# Patient Record
Sex: FEMALE | Race: BLACK OR AFRICAN AMERICAN | NOT HISPANIC OR LATINO | Employment: UNEMPLOYED | URBAN - METROPOLITAN AREA
[De-identification: names, ages, dates, MRNs, and addresses within clinical notes are randomized per-mention and may not be internally consistent; named-entity substitution may affect disease eponyms.]

---

## 2023-12-20 ENCOUNTER — OFFICE VISIT (OUTPATIENT)
Dept: ORTHOPEDIC SURGERY | Facility: CLINIC | Age: 53
End: 2023-12-20
Payer: COMMERCIAL

## 2023-12-20 ENCOUNTER — ANCILLARY PROCEDURE (OUTPATIENT)
Dept: RADIOLOGY | Facility: CLINIC | Age: 53
End: 2023-12-20
Payer: COMMERCIAL

## 2023-12-20 DIAGNOSIS — M54.2 NECK PAIN: ICD-10-CM

## 2023-12-20 DIAGNOSIS — M54.50 LOW BACK PAIN, UNSPECIFIED BACK PAIN LATERALITY, UNSPECIFIED CHRONICITY, UNSPECIFIED WHETHER SCIATICA PRESENT: ICD-10-CM

## 2023-12-20 PROCEDURE — 72110 X-RAY EXAM L-2 SPINE 4/>VWS: CPT

## 2023-12-20 PROCEDURE — 72050 X-RAY EXAM NECK SPINE 4/5VWS: CPT

## 2023-12-20 PROCEDURE — 99204 OFFICE O/P NEW MOD 45 MIN: CPT | Performed by: PHYSICIAN ASSISTANT

## 2023-12-20 PROCEDURE — 72110 X-RAY EXAM L-2 SPINE 4/>VWS: CPT | Performed by: RADIOLOGY

## 2023-12-20 PROCEDURE — 72050 X-RAY EXAM NECK SPINE 4/5VWS: CPT | Performed by: RADIOLOGY

## 2023-12-20 RX ORDER — ACETAMINOPHEN 500 MG
1000 TABLET ORAL EVERY 12 HOURS PRN
Qty: 30 TABLET | Refills: 1 | Status: SHIPPED | OUTPATIENT
Start: 2023-12-20 | End: 2024-01-19

## 2023-12-20 NOTE — PROGRESS NOTES
Scar is a 53-year-old female who presents today to be evaluated for chronic cervical and lumbar pain.  The patient was referred from her treatment facility to follow-up with our spine team today.  She is currently in a treatment facility for crack cocaine and marijuana, she has been using since she was 22 years old and she has been sober for 13 days.    She denies any injury or fall, she states that she has been dealing with this for quite some time but over the last 3 months or so it started to progressively get worse.    In regards to her cervical symptoms, she states that she is dealing with chronic neck pain and stiffness predominantly worse on the left than the right with bilateral upper extremity radiculopathy.  She does state that she often finds herself shaking her hands to for some relief.  Currently she does not have any tremors or decrease in  strength but did mention that periodically she will have episodes of that.  She denies any falls or injuries she is not dragging or tripping over her feet but again did state that there are days where she will periodically feel like she is off balance.    As far as her lumbar spine is concerned, patient states that she has low back pain that is near an old scar of hers.  Thankfully no lower extremity radiculopathy or weakness.  Full control of her bowel and bladder no hip or groin pain.    From a treatment standpoint she was taking ibuprofen previously but the provider at the treatment facility recently just started her on meloxicam.  No formal physical therapy or pain management injections have been done.    Family, social, and medical histories are obtained and reviewed.  -She currently smokes cigarettes every day, 4-5 on average.  -She is in a treatment facility for drug use, she has been sober for 13 days.  She is currently unemployed.    I reviewed the complete 30-point review of systems that was documented on the scanned patient intake form.  All other  systems are non-contributory except as defined in history of present illness.    Const: Well-appearing, well-nourished female in no distress.  Eyes: Normal appearing sclera and conjunctiva, no jaundice, pupils normal in appearance  Neck: No masses or lymphadenopathy appreciated  Resp: breathing comfortably, normal respiratory rate  CV: No upper or lower extremity edema.  Musculoskeletal: [Normal gait]. [Able to heel/toe walk without trouble].  [Cervical] ROM [supple].  Strength exam of upper and lower extremities reveals 5/5 strength in all major muscle groups   [No intrinsic wasting.] [Negative] Spurling sign.  [Negative] straight leg raise [bilaterally].  Neuro: Sensation is intact and equal bilaterally. Deep tendon reflexes are [normal and symmetric].  [No clonus], [negative] Brasher sign, [negative] Lhermitte's sign  Skin: Intact without any lesions, normal turgor  Psych: Alert and oriented x3, normal mood and affect    We are going to obtain x-rays on both her cervical and lumbar spine.  If any abnormality or concerning factors are seen I will call the patient.  She will get these after the visit.    Moving forward, we will treat this very conservatively.  She was given a referral for physical therapy to address both her cervical and lumbar spine.  She did ask for a prescription of Tylenol to be sent in so that she can take this at the facility as well.  She will follow-up with me for a clinical recheck in 8 weeks.    This note was dictated using speech recognition software and was not corrected for spelling or grammatical errors

## 2024-02-13 ENCOUNTER — HOSPITAL ENCOUNTER (EMERGENCY)
Facility: HOSPITAL | Age: 54
Discharge: HOME | End: 2024-02-13
Payer: COMMERCIAL

## 2024-02-13 ENCOUNTER — APPOINTMENT (OUTPATIENT)
Dept: RADIOLOGY | Facility: HOSPITAL | Age: 54
End: 2024-02-13
Payer: COMMERCIAL

## 2024-02-13 VITALS
TEMPERATURE: 98.9 F | HEIGHT: 66 IN | BODY MASS INDEX: 34.55 KG/M2 | DIASTOLIC BLOOD PRESSURE: 86 MMHG | HEART RATE: 104 BPM | SYSTOLIC BLOOD PRESSURE: 120 MMHG | WEIGHT: 215 LBS | OXYGEN SATURATION: 99 % | RESPIRATION RATE: 17 BRPM

## 2024-02-13 DIAGNOSIS — M17.10 ARTHRITIS OF KNEE: Primary | ICD-10-CM

## 2024-02-13 PROCEDURE — 73562 X-RAY EXAM OF KNEE 3: CPT | Mod: LEFT SIDE | Performed by: RADIOLOGY

## 2024-02-13 PROCEDURE — 73562 X-RAY EXAM OF KNEE 3: CPT | Mod: LT

## 2024-02-13 PROCEDURE — 99283 EMERGENCY DEPT VISIT LOW MDM: CPT

## 2024-02-13 PROCEDURE — 99284 EMERGENCY DEPT VISIT MOD MDM: CPT | Performed by: PHYSICIAN ASSISTANT

## 2024-02-13 PROCEDURE — 99284 EMERGENCY DEPT VISIT MOD MDM: CPT

## 2024-02-13 PROCEDURE — 2500000004 HC RX 250 GENERAL PHARMACY W/ HCPCS (ALT 636 FOR OP/ED): Mod: SE

## 2024-02-13 PROCEDURE — 96372 THER/PROPH/DIAG INJ SC/IM: CPT

## 2024-02-13 RX ORDER — KETOROLAC TROMETHAMINE 30 MG/ML
30 INJECTION, SOLUTION INTRAMUSCULAR; INTRAVENOUS ONCE
Status: COMPLETED | OUTPATIENT
Start: 2024-02-13 | End: 2024-02-13

## 2024-02-13 RX ORDER — KETOROLAC TROMETHAMINE 30 MG/ML
INJECTION, SOLUTION INTRAMUSCULAR; INTRAVENOUS
Status: COMPLETED
Start: 2024-02-13 | End: 2024-02-13

## 2024-02-13 RX ADMIN — KETOROLAC TROMETHAMINE 30 MG: 30 INJECTION, SOLUTION INTRAMUSCULAR; INTRAVENOUS at 12:24

## 2024-02-13 ASSESSMENT — LIFESTYLE VARIABLES
HAVE PEOPLE ANNOYED YOU BY CRITICIZING YOUR DRINKING: NO
EVER HAD A DRINK FIRST THING IN THE MORNING TO STEADY YOUR NERVES TO GET RID OF A HANGOVER: NO
EVER FELT BAD OR GUILTY ABOUT YOUR DRINKING: NO
HAVE YOU EVER FELT YOU SHOULD CUT DOWN ON YOUR DRINKING: NO

## 2024-02-13 ASSESSMENT — PAIN SCALES - GENERAL
PAINLEVEL_OUTOF10: 9
PAINLEVEL_OUTOF10: 9

## 2024-02-13 ASSESSMENT — COLUMBIA-SUICIDE SEVERITY RATING SCALE - C-SSRS
1. IN THE PAST MONTH, HAVE YOU WISHED YOU WERE DEAD OR WISHED YOU COULD GO TO SLEEP AND NOT WAKE UP?: NO
2. HAVE YOU ACTUALLY HAD ANY THOUGHTS OF KILLING YOURSELF?: NO
6. HAVE YOU EVER DONE ANYTHING, STARTED TO DO ANYTHING, OR PREPARED TO DO ANYTHING TO END YOUR LIFE?: NO

## 2024-02-13 ASSESSMENT — PAIN - FUNCTIONAL ASSESSMENT: PAIN_FUNCTIONAL_ASSESSMENT: 0-10

## 2024-02-13 NOTE — ED PROVIDER NOTES
"HPI   Chief Complaint   Patient presents with    Knee Pain       This is a 53-year-old female with a history of bipolar disorder, asthma, sciatica who presents with 2 weeks of atraumatic left knee pain.  Denies recent injuries, no recent significant physical activity including extended hikes.  States the pain is diffuse in the left knee and feels \"deeper inside.\"  She has no numbness or tingling sensations.  Has no history of DVT or PE, no recent travel, hospitalizations, surgeries.  No associated chest pain or shortness of breath or palpitations.  She is still able to ambulate.  Has been using diclofenac gel, Tylenol, meloxicam and turmeric supplements, last dose was about 6 hours ago.            Christiano Coma Scale Score: 15                     Patient History   No past medical history on file.  No past surgical history on file.  No family history on file.  Social History     Tobacco Use    Smoking status: Every Day     Types: Cigarettes    Smokeless tobacco: Former   Substance Use Topics    Alcohol use: Not on file    Drug use: Yes     Types: \"Crack\" cocaine, Marijuana     Comment: Currently sober for 13 days       Physical Exam   ED Triage Vitals [02/13/24 1044]   Temperature Heart Rate Respirations BP   37.2 °C (98.9 °F) (!) 104 17 120/86      Pulse Ox Temp src Heart Rate Source Patient Position   99 % -- Monitor --      BP Location FiO2 (%)     -- --       Physical Exam  Constitutional:       Appearance: Normal appearance.   HENT:      Head: Normocephalic and atraumatic.   Pulmonary:      Effort: Pulmonary effort is normal.   Musculoskeletal:      Cervical back: Normal range of motion.      Comments: Left knee diffusely tender in the anterior/bilateral/popliteal areas, no significant swelling noted, full range of motion, sensation intact, no overlying erythema, warmth   Skin:     General: Skin is warm and dry.   Neurological:      General: No focal deficit present.      Mental Status: She is alert and oriented " to person, place, and time.   Psychiatric:         Mood and Affect: Mood normal.         Behavior: Behavior normal.         ED Course & MDM   Diagnoses as of 02/13/24 1615   Arthritis of knee       Medical Decision Making  53-year-old female, is alert and oriented x 3, afebrile and hemodynamically stable presenting with 2 weeks of atraumatic diffuse left knee pain.    Examination reveals intact pulse/motor/sensation, no overlying skin abnormalities including redness or warmth that would suggest septic arthritis.    She was given Toradol and an x-ray of the left knee was obtained.  X-ray with mild osteoarthritis.    Ambulating independently.  Pain better post Toradol.  Advised to follow-up with orthopedic surgery as she is already scheduled for later this month.  Given contact information to sports medicine clinic she can call as needed.  Advised to continue doing as she is at home including acetaminophen, meloxicam and diclofenac gel.  Discharged in stable condition.  All questions answered.          Procedure  Procedures     Kam Mcneil PA-C  02/13/24 1610

## 2024-02-21 ENCOUNTER — APPOINTMENT (OUTPATIENT)
Dept: ORTHOPEDIC SURGERY | Facility: CLINIC | Age: 54
End: 2024-02-21
Payer: COMMERCIAL

## 2024-02-29 ENCOUNTER — OFFICE VISIT (OUTPATIENT)
Dept: ORTHOPEDIC SURGERY | Facility: CLINIC | Age: 54
End: 2024-02-29
Payer: COMMERCIAL

## 2024-02-29 VITALS — HEIGHT: 66 IN | WEIGHT: 215 LBS | BODY MASS INDEX: 34.55 KG/M2

## 2024-02-29 DIAGNOSIS — M25.562 ACUTE PAIN OF LEFT KNEE: ICD-10-CM

## 2024-02-29 DIAGNOSIS — M54.16 LUMBAR RADICULITIS: Primary | ICD-10-CM

## 2024-02-29 PROCEDURE — 99213 OFFICE O/P EST LOW 20 MIN: CPT | Mod: ZK | Performed by: PHYSICIAN ASSISTANT

## 2024-02-29 PROCEDURE — 99213 OFFICE O/P EST LOW 20 MIN: CPT | Performed by: PHYSICIAN ASSISTANT

## 2024-02-29 RX ORDER — IBUPROFEN 600 MG/1
600 TABLET ORAL EVERY 8 HOURS PRN
Qty: 180 TABLET | Refills: 0 | Status: SHIPPED | OUTPATIENT
Start: 2024-02-29 | End: 2024-04-22 | Stop reason: HOSPADM

## 2024-02-29 ASSESSMENT — PAIN - FUNCTIONAL ASSESSMENT: PAIN_FUNCTIONAL_ASSESSMENT: NO/DENIES PAIN

## 2024-02-29 NOTE — PROGRESS NOTES
Scar return today for a follow-up appointment.  We have been following her in regards to her ongoing neck and back pain.  At that visit the patient was given a referral for physical therapy which she states has helped her tremendously.  She is also been taking the meloxicam as prescribed and has seen great improvement with this.  She states that her back pain is almost completely gone.    Unfortunately, the patient has since then started to develop some bilateral wrist and hand pain, worse left-sided as well as some new onset left knee pain and she states that she went to the emergency room and she was told that she has bone-on-bone in her left knee.  I reviewed her x-rays today and she has mild arthritis with some ossification of her patella but nothing that is concerning from a surgical standpoint at the moment.    I am going to provide the patient a referral to see both our knee team as well as our hand team for further workup with an those regions.  She is going to continue with the meloxicam as needed and she will continue with the stretching and exercise program for her back.    She will follow-up with our spine team as needed.    I reviewed the complete 30-point review of systems that was documented on the scanned patient intake form.  All other systems are non-contributory except as defined in history of present illness.    This note was dictated using speech recognition software and was not corrected for spelling or grammatical errors

## 2024-03-04 ENCOUNTER — APPOINTMENT (OUTPATIENT)
Dept: ORTHOPEDIC SURGERY | Facility: CLINIC | Age: 54
End: 2024-03-04
Payer: COMMERCIAL

## 2024-03-08 ENCOUNTER — APPOINTMENT (OUTPATIENT)
Dept: ORTHOPEDIC SURGERY | Facility: CLINIC | Age: 54
End: 2024-03-08
Payer: COMMERCIAL

## 2024-03-14 ENCOUNTER — OFFICE VISIT (OUTPATIENT)
Dept: ORTHOPEDIC SURGERY | Facility: CLINIC | Age: 54
End: 2024-03-14
Payer: COMMERCIAL

## 2024-03-14 ENCOUNTER — APPOINTMENT (OUTPATIENT)
Dept: ORTHOPEDIC SURGERY | Facility: CLINIC | Age: 54
End: 2024-03-14
Payer: COMMERCIAL

## 2024-03-14 ENCOUNTER — HOSPITAL ENCOUNTER (OUTPATIENT)
Dept: RADIOLOGY | Facility: CLINIC | Age: 54
Discharge: HOME | End: 2024-03-14
Payer: COMMERCIAL

## 2024-03-14 VITALS — WEIGHT: 249 LBS | HEIGHT: 66 IN | BODY MASS INDEX: 40.02 KG/M2

## 2024-03-14 DIAGNOSIS — M23.92 ACUTE INTERNAL DERANGEMENT OF LEFT KNEE: ICD-10-CM

## 2024-03-14 DIAGNOSIS — M25.562 CHRONIC PAIN OF LEFT KNEE: ICD-10-CM

## 2024-03-14 DIAGNOSIS — G89.29 CHRONIC PAIN OF LEFT KNEE: Primary | ICD-10-CM

## 2024-03-14 DIAGNOSIS — G89.29 CHRONIC PAIN OF LEFT KNEE: ICD-10-CM

## 2024-03-14 DIAGNOSIS — M25.562 CHRONIC PAIN OF LEFT KNEE: Primary | ICD-10-CM

## 2024-03-14 PROCEDURE — 73564 X-RAY EXAM KNEE 4 OR MORE: CPT | Mod: LT

## 2024-03-14 PROCEDURE — 99215 OFFICE O/P EST HI 40 MIN: CPT | Performed by: STUDENT IN AN ORGANIZED HEALTH CARE EDUCATION/TRAINING PROGRAM

## 2024-03-14 PROCEDURE — 20610 DRAIN/INJ JOINT/BURSA W/O US: CPT | Performed by: STUDENT IN AN ORGANIZED HEALTH CARE EDUCATION/TRAINING PROGRAM

## 2024-03-14 PROCEDURE — 73564 X-RAY EXAM KNEE 4 OR MORE: CPT | Mod: LEFT SIDE | Performed by: RADIOLOGY

## 2024-03-14 RX ORDER — TRIAMCINOLONE ACETONIDE 40 MG/ML
40 INJECTION, SUSPENSION INTRA-ARTICULAR; INTRAMUSCULAR
Status: COMPLETED | OUTPATIENT
Start: 2024-03-14 | End: 2024-03-14

## 2024-03-14 RX ORDER — LIDOCAINE HYDROCHLORIDE 10 MG/ML
4 INJECTION INFILTRATION; PERINEURAL
Status: COMPLETED | OUTPATIENT
Start: 2024-03-14 | End: 2024-03-14

## 2024-03-14 RX ADMIN — TRIAMCINOLONE ACETONIDE 40 MG: 40 INJECTION, SUSPENSION INTRA-ARTICULAR; INTRAMUSCULAR at 14:55

## 2024-03-14 RX ADMIN — LIDOCAINE HYDROCHLORIDE 4 ML: 10 INJECTION INFILTRATION; PERINEURAL at 14:55

## 2024-03-14 ASSESSMENT — PAIN SCALES - GENERAL: PAINLEVEL_OUTOF10: 9

## 2024-03-14 ASSESSMENT — PAIN - FUNCTIONAL ASSESSMENT: PAIN_FUNCTIONAL_ASSESSMENT: 0-10

## 2024-03-14 ASSESSMENT — PAIN DESCRIPTION - DESCRIPTORS: DESCRIPTORS: ACHING

## 2024-03-14 NOTE — PROGRESS NOTES
SUSANNA/TKA Related Summary           L hip: N  L knee: N  R hip: N  R knee: N  Lumbar surgery or significant pathology: N            CC/SUBJECTIVE/HPI            PCP: No Assigned PCP Generic Provider, MD  Referring provider: No ref. provider found  Occupation: Currently not employed  Hobbies: Currently living in a rehabilitation center. 3mo sober.  Scar Prince is a very pleasant 53 y.o. female presenting for 2mo of atraumatic onset L knee pain.  She was recently seen for this pain in the ED on 2/13/2024, where she was diagnosed with mild to moderate osteoarthritis and referred to orthopedics for follow-up and management. She recounts no specific inciting event, but the pain has been getting worse over time. No N/V/C/F.    L knee  Symptoms  Pain: 9/10  Onset: atraumatic  Duration: <3mo  Location: front knee, behind knee, and all over  Quality: sharp/stab, catch/lock, and radiate  Limitations: morning stiffness, pain after activity, feeling unstable, night pain, difficulty with stairs, difficulty in/out of chair/car, and difficulty with socks/shoes/clothes  Ambulation limit due to pain: 100-500ft  Other symptoms: none  Treatment  Tried: activity modification, home exercises, ice/heat, OTCs, and voltaren gel  Most recent injection: none  Assistive device: none  Treatment attempted for <3mo and is never effective            HISTORIES (System Generated and Pt Reported on Form Today)       Dental  Pt reported: edentulous     PMH  Pt reported: Denies heart/lung/kidney/liver issues, DM, stroke, seizure, bariatric surgery, anticoag, MRSA, cancer, personal/familial coagulopathies except: none in addition to below  System generated (PMH, problem list both included since EMR change caused discrepancies): No past medical history on file. There is no problem list on file for this patient.  Review of outside records shows:  Irritable bowel syndrome  Bipolar disorder  Asthma    PSH  Pt reported: Per above.   System generated: No  "past surgical history on file.    Family Hx  Pt reported clot/coagulopathies: none  System generated: No family history on file.    Social Hx  Pt reported substance use: cigarettes (0.5PPD)  System generated:   Social History     Tobacco Use    Smoking status: Every Day     Types: Cigarettes    Smokeless tobacco: Former   Substance Use Topics    Drug use: Yes     Types: \"Crack\" cocaine, Marijuana     Comment: Currently sober for 13 days   Review of outside records shows:  0.5 PPD  2 drinks/week  Crack, marijuana, cocaine sober since 12/2023    Allergies  Pt reported (meds, metals): N  System generated: No Known Allergies    Current Meds  System generated:   Current Outpatient Medications:     ibuprofen 600 mg tablet, Take 1 tablet (600 mg) by mouth every 8 hours if needed for mild pain (1 - 3)., Disp: 180 tablet, Rfl: 0    ROS: Neg except HPI            OBJECTIVE            Physical exam  Estimated body mass index is 34.7 kg/m² as calculated from the following:    Height as of 2/29/24: 1.676 m (5' 6\").    Weight as of 2/29/24: 97.5 kg (215 lb).  Gen/psych: NAD, conversational, appropriate    Ambulation  Gait: antalgic  Assistive device: none  Spine  Lumbar tenderness: neg  Limited ROM: neg, with no radiation or pain with flex/ex, lateral bending  SLR test: neg    Focused MSK exam: L  Knee  Thrust: neg  Skin/incision: normal  Effusion: moderate  Alignment: neutral  Alignment correctable: na  Knee tenderness: diffuse  Pes or Gerdy's tenderness: neg  Crepitation: none  Extension: passive flexion contracture 0-5° and active extension lag 0°  Flexion: 100°, limited by pain  Anterior drawer: Unable to tolerate true testing, but grossly stable  Posterior drawer: Unable to tolerate true testing, but grossly stable  Varus/Valgus in extension: Mild laxity laterally. Symmetric exam to RLE  Varus/Valgus in flexion: stable  Referred pain to knee with hip 90° flexion and 45° ER/IR: neg  Neurovascular    Strength: 4+/5 " hip/knee/ankle flexion and extension  Sensory (L2-S1): SILT throughout lower extremity  Edema/stasis: no pitting edema  Pulse: DP 1+, PT 1+     Imaging  3/14/2024 L knee radiographs ([facility], WB AP/lateral, WB AP flexion) on my read: Joint space narrowing (medial tibiofemoral mild, lateral tibiofemoral minimal, patellofemoral minimal) with no significant signs of degeneration. Limb alignment neutral.            ASSESSMENT & PLAN           Patient verbalized understanding of below A&P. All questions answered.  L Meniscal injury vs exacerbation of mild DJD  History, exam, and imaging are consistent with either an exacerbation of underlying OA or a meniscal injury.  We discussed that, after exhausting more conservative measures as the patient has, a corticosteroid injection is a reasonable next step for either of these diagnoses.  Alternatively, some patients prefer to proceed to an MRI to obtain a diagnosis prior to having this injection.  In this case, the patient prefers to move forward with an injection now and see if the symptoms resolve.  This injection was provided today.  If this resolves the symptoms, may follow-up as needed.  If this does not resolve the symptoms, patient will contact the office to have an MRI order placed in about 3wks.  PMH, PSH, other considerations discussed  Requirement to stop nicotine 6wks before elective surgery.  Psychiatric conditions need to be stable for elective surgery (hx of bipolar)  Higher lifetime risk of revision with index surgery at young age.  BMI<40 but on spectrum of increased risk of surgical complications.  Currently a resident of [facility] treatments that are growing.  Polysubstance use (crack, marijuana, cocaine) as recently as 12/2023. Pt is very pleasant, forthcoming, and proud of her 3mo of sobriety.  Follow-up: As needed.    L Inj/Asp: L knee on 3/14/2024 2:55 PM  Indications: pain  Details: 22 G needle, anterolateral approach  Medications: 40 mg triamcinolone  acetonide 40 mg/mL; 4 mL lidocaine 10 mg/mL (1 %)  Outcome: tolerated well, no immediate complications  Procedure, treatment alternatives, risks and benefits explained, specific risks discussed. Consent was given by the patient. Immediately prior to procedure a time out was called to verify the correct patient, procedure, equipment, support staff and site/side marked as required. Patient was prepped and draped in the usual sterile fashion.         4/10/2024 addendum: Scar continues to have left knee pain.  Additionally, she has noticed additional swelling.  I placed a left knee MRI order and provided her with the number to call and schedule it.  She will contact us after it is completed.    6/3/2024 addendum: 6/1/2024 left knee MRI shows radial tear of medial meniscus, moderate cartilage loss in the medial tibiofemoral and patellofemoral compartments, and a large effusion with synovitis.  I spoke with Mrs. Prince on the phone.  Her primary provider had obtained elevated CRP and rheumatoid labs and provided a referral to a rheumatologist.  I discussed with Mrs. Prinec that I agree this is the next best step given her effusion, synovitis, and lack of intra-articular injury significant enough to account for them.  She understood and will keep her appointment scheduled with rheumatology.

## 2024-04-02 ENCOUNTER — OFFICE VISIT (OUTPATIENT)
Dept: ORTHOPEDIC SURGERY | Facility: CLINIC | Age: 54
End: 2024-04-02
Payer: COMMERCIAL

## 2024-04-02 VITALS — WEIGHT: 249 LBS | BODY MASS INDEX: 40.02 KG/M2 | HEIGHT: 66 IN

## 2024-04-02 DIAGNOSIS — G56.03 CARPAL TUNNEL SYNDROME, BILATERAL: Primary | ICD-10-CM

## 2024-04-02 PROCEDURE — 2500000004 HC RX 250 GENERAL PHARMACY W/ HCPCS (ALT 636 FOR OP/ED): Performed by: ORTHOPAEDIC SURGERY

## 2024-04-02 PROCEDURE — 99213 OFFICE O/P EST LOW 20 MIN: CPT | Performed by: ORTHOPAEDIC SURGERY

## 2024-04-02 PROCEDURE — 2500000005 HC RX 250 GENERAL PHARMACY W/O HCPCS: Performed by: ORTHOPAEDIC SURGERY

## 2024-04-02 PROCEDURE — 20526 THER INJECTION CARP TUNNEL: CPT | Performed by: ORTHOPAEDIC SURGERY

## 2024-04-02 RX ORDER — TRIAMCINOLONE ACETONIDE 40 MG/ML
20 INJECTION, SUSPENSION INTRA-ARTICULAR; INTRAMUSCULAR
Status: COMPLETED | OUTPATIENT
Start: 2024-04-02 | End: 2024-04-02

## 2024-04-02 RX ORDER — LIDOCAINE HYDROCHLORIDE 10 MG/ML
0.5 INJECTION INFILTRATION; PERINEURAL
Status: COMPLETED | OUTPATIENT
Start: 2024-04-02 | End: 2024-04-02

## 2024-04-02 RX ADMIN — TRIAMCINOLONE ACETONIDE 20 MG: 40 INJECTION, SUSPENSION INTRA-ARTICULAR; INTRAMUSCULAR at 19:20

## 2024-04-02 RX ADMIN — LIDOCAINE HYDROCHLORIDE 0.5 ML: 10 INJECTION, SOLUTION INFILTRATION; PERINEURAL at 19:20

## 2024-04-02 ASSESSMENT — PAIN SCALES - GENERAL: PAINLEVEL_OUTOF10: 5 - MODERATE PAIN

## 2024-04-02 ASSESSMENT — PAIN DESCRIPTION - DESCRIPTORS: DESCRIPTORS: ACHING

## 2024-04-02 ASSESSMENT — PAIN - FUNCTIONAL ASSESSMENT: PAIN_FUNCTIONAL_ASSESSMENT: 0-10

## 2024-04-02 NOTE — PROGRESS NOTES
[facility]  Hand and Upper Extremity Service  Initial evaluation / Consultation         Consult requested by Referring Physician: Dr. Chappell    Chief Complaint: Carpal tunnel syndrome          55 y/o female, otherwise healthy right hand dominant. Presenting today for bilateral hand pain and stiffness. Experiencing numbness and tingling. Symptoms have been present for several months. No prior workup or treatment. At times the pain radiates up her arms all the way up to her shoulders and she has a hard time elevating her hands over her head.        Please refer to New Patient Intake Form scanned into patient's electronic record for self reported past medical history, past surgical history, medications, allergies, family history, social history and 10 point review of systems    Examination:  Constitutional: Oriented to person, place, and time.  Appears well-developed and well-nourished.  Head: Normocephalic and atraumatic.  Eyes: Pupils are equal, round, and reactive to light.  Cardiovascular: Intact distal pulses.  Pulmonary/Chest/Breast: Effort normal. No respiratory distress.  Neurological: Alert and oriented to person, place, and time.  Skin: Skin is warm and dry.  Psychiatric: normal mood and affect.  Behavior is normal.  Musculoskeletal: Bilateral hands reveals normal findings. No muscular atrophy. No significant swelling. No deformities. Full range of motion without triggering. Slight tingling in the tips of radial digits bilaterally. Positive Pushpa median nerve compression test bilaterally. Positive Tinel's sign on the right.               Impression: Bilateral carpal tunnel syndrome, right worse than left            Plan: We discussed the diagnosis and treatment options. I believe injections will be very helpful. She has agreed to this treatment. Monitor response and symptoms. If her shoulder symptoms persist I have given her contact information to see one of our primary  sports medicine doctors.            In Office Procedures Performed: Bilateral carpal tunnel injections     Hand / UE Inj/Asp: bilateral carpal tunnel for carpal tunnel syndrome on 4/2/2024 7:20 PM  Indications: pain and therapeutic  Details: 25 G needle, volar approach  Medications (Right): 20 mg triamcinolone acetonide 40 mg/mL; 0.5 mL lidocaine 10 mg/mL (1 %)  Medications (Left): 20 mg triamcinolone acetonide 40 mg/mL; 0.5 mL lidocaine 10 mg/mL (1 %)  Outcome: tolerated well, no immediate complications  Procedure, treatment alternatives, risks and benefits explained, specific risks discussed. Consent was given by the patient. Immediately prior to procedure a time out was called to verify the correct patient, procedure, equipment, support staff and site/side marked as required. Patient was prepped and draped in the usual sterile fashion.                  Medications Prescribed: None                    Follow up: As needed              Buster Hoover MD  [facility]  Department of Orthopaedic Surgery  Hand and Upper Extremity Reconstruction      Scribe Attestation  By signing my name below, I, Jordan Barr , Scrkendra   attest that this documentation has been prepared under the direction and in the presence of Dr. Buster Hoover.      Dictation performed with the use of voice recognition software.  Syntax and grammatical errors may exist.

## 2024-04-11 ENCOUNTER — APPOINTMENT (OUTPATIENT)
Dept: ORTHOPEDIC SURGERY | Facility: CLINIC | Age: 54
End: 2024-04-11
Payer: COMMERCIAL

## 2024-04-15 NOTE — PROGRESS NOTES
[facility]  Hand and Upper Extremity Service  Follow up visit         Follow up for: Bilateral carpal tunnel syndrome     Interval History: Given bilateral carpal tunnel injections on last visit on 4/2. She indicates they essentially did nothing to alleviate her pain. She's frustrated and is about to start a new job and hoping there's something that can be done to help address her symptoms and pain.               Past medical history, medications, allergies, surgical history and review of systems are reviewed and otherwise unchanged when compared to last visit on 4/2/24.         Examination:  Constitutional: Oriented to person, place, and time.  Appears well-developed and well-nourished.  Head: Normocephalic and atraumatic.  Eyes: Pupils are equal, round, and reactive to light.  Cardiovascular: Intact distal pulses.  Pulmonary/Chest/Breast: Effort normal. No respiratory distress.  Neurological: Alert and oriented to person, place, and time.  Skin: Skin is warm and dry.  Psychiatric: normal mood and affect.  Behavior is normal.  Musculoskeletal: Bilateral hands reveal normal appearance. Full digital range of motion. Positive Pushpa nerve compression test. Diminished sensation in her fingertips.               Personal Interpretation of Diagnostic studies: No new images obtained                Impression: Bilateral carpal tunnel syndrome, right worse than left          Plan: I would've expected the injections to help but given that they didn't has raised concern that this may be something other than carpal tunnel syndrome. I've recommended a neuromuscular ultrasound for further investigation. In the meantime, she indicates that Toradol has been effective in managing a lot of these symptoms in the past so I've given her a prescription for a 5 day course of Toradol and advised that she not take any ibuprofen or other anti-inflammatory medications while on Toradol. She'll contact my  office after ultrasound has been completed and I will follow up with her for other treatment recommendations.           In Office Procedures Performed: None              Medications Prescribed: Toradol 10 mg for 5 days               Follow up: Pending results              Buster Hoover MD  [facility]  Department of Orthopaedic Surgery  Hand and Upper Extremity Reconstruction    Scribe Attestation  By signing my name below, I, Jordan Momo , Scribe   attest that this documentation has been prepared under the direction and in the presence of Dr. Buster Hoover.    Dictation performed with the use of voice recognition software.  Syntax and grammatical errors may exist.

## 2024-04-16 ENCOUNTER — OFFICE VISIT (OUTPATIENT)
Dept: ORTHOPEDIC SURGERY | Facility: CLINIC | Age: 54
End: 2024-04-16
Payer: COMMERCIAL

## 2024-04-16 VITALS — WEIGHT: 249 LBS | BODY MASS INDEX: 40.02 KG/M2 | HEIGHT: 66 IN

## 2024-04-16 DIAGNOSIS — G56.03 CARPAL TUNNEL SYNDROME, BILATERAL: Primary | ICD-10-CM

## 2024-04-16 PROCEDURE — 99213 OFFICE O/P EST LOW 20 MIN: CPT | Performed by: ORTHOPAEDIC SURGERY

## 2024-04-16 RX ORDER — KETOROLAC TROMETHAMINE 10 MG/1
10 TABLET, FILM COATED ORAL EVERY 6 HOURS PRN
Qty: 20 TABLET | Refills: 0 | Status: SHIPPED | OUTPATIENT
Start: 2024-04-16 | End: 2024-04-22 | Stop reason: HOSPADM

## 2024-04-16 ASSESSMENT — PAIN SCALES - GENERAL: PAINLEVEL_OUTOF10: 5 - MODERATE PAIN

## 2024-04-16 ASSESSMENT — PAIN - FUNCTIONAL ASSESSMENT: PAIN_FUNCTIONAL_ASSESSMENT: 0-10

## 2024-04-16 ASSESSMENT — PAIN DESCRIPTION - DESCRIPTORS: DESCRIPTORS: ACHING

## 2024-04-19 ENCOUNTER — HOSPITAL ENCOUNTER (OUTPATIENT)
Facility: HOSPITAL | Age: 54
Setting detail: OBSERVATION
Discharge: HOME | End: 2024-04-22
Attending: EMERGENCY MEDICINE | Admitting: NURSE PRACTITIONER
Payer: COMMERCIAL

## 2024-04-19 ENCOUNTER — APPOINTMENT (OUTPATIENT)
Dept: RADIOLOGY | Facility: HOSPITAL | Age: 54
End: 2024-04-19
Payer: COMMERCIAL

## 2024-04-19 DIAGNOSIS — S83.207A TEAR OF LEFT MENISCUS AS CURRENT INJURY, INITIAL ENCOUNTER: ICD-10-CM

## 2024-04-19 DIAGNOSIS — M25.462 KNEE EFFUSION, LEFT: ICD-10-CM

## 2024-04-19 DIAGNOSIS — J90 LARGE PLEURAL EFFUSION: ICD-10-CM

## 2024-04-19 DIAGNOSIS — M54.50 ACUTE MIDLINE LOW BACK PAIN WITHOUT SCIATICA: Primary | ICD-10-CM

## 2024-04-19 PROCEDURE — 2500000001 HC RX 250 WO HCPCS SELF ADMINISTERED DRUGS (ALT 637 FOR MEDICARE OP): Mod: SE | Performed by: PHYSICIAN ASSISTANT

## 2024-04-19 PROCEDURE — 99285 EMERGENCY DEPT VISIT HI MDM: CPT | Mod: 25

## 2024-04-19 PROCEDURE — 72131 CT LUMBAR SPINE W/O DYE: CPT

## 2024-04-19 PROCEDURE — 99285 EMERGENCY DEPT VISIT HI MDM: CPT | Performed by: PHYSICIAN ASSISTANT

## 2024-04-19 PROCEDURE — 72131 CT LUMBAR SPINE W/O DYE: CPT | Performed by: RADIOLOGY

## 2024-04-19 RX ORDER — CYCLOBENZAPRINE HCL 10 MG
10 TABLET ORAL ONCE
Status: COMPLETED | OUTPATIENT
Start: 2024-04-19 | End: 2024-04-19

## 2024-04-19 RX ORDER — CYCLOBENZAPRINE HCL 10 MG
10 TABLET ORAL 3 TIMES DAILY PRN
Qty: 21 TABLET | Refills: 0 | Status: SHIPPED | OUTPATIENT
Start: 2024-04-19 | End: 2024-04-26

## 2024-04-19 RX ADMIN — CYCLOBENZAPRINE 10 MG: 10 TABLET, FILM COATED ORAL at 21:12

## 2024-04-19 ASSESSMENT — PAIN - FUNCTIONAL ASSESSMENT: PAIN_FUNCTIONAL_ASSESSMENT: 0-10

## 2024-04-19 ASSESSMENT — COLUMBIA-SUICIDE SEVERITY RATING SCALE - C-SSRS
2. HAVE YOU ACTUALLY HAD ANY THOUGHTS OF KILLING YOURSELF?: NO
1. IN THE PAST MONTH, HAVE YOU WISHED YOU WERE DEAD OR WISHED YOU COULD GO TO SLEEP AND NOT WAKE UP?: NO
6. HAVE YOU EVER DONE ANYTHING, STARTED TO DO ANYTHING, OR PREPARED TO DO ANYTHING TO END YOUR LIFE?: NO

## 2024-04-19 ASSESSMENT — PAIN SCALES - GENERAL: PAINLEVEL_OUTOF10: 10 - WORST POSSIBLE PAIN

## 2024-04-20 PROBLEM — R26.2 AMBULATORY DYSFUNCTION: Status: ACTIVE | Noted: 2024-04-20

## 2024-04-20 LAB
ALBUMIN SERPL BCP-MCNC: 4 G/DL (ref 3.4–5)
ALP SERPL-CCNC: 105 U/L (ref 33–110)
ALT SERPL W P-5'-P-CCNC: 15 U/L (ref 7–45)
ANION GAP SERPL CALC-SCNC: 15 MMOL/L (ref 10–20)
AST SERPL W P-5'-P-CCNC: 11 U/L (ref 9–39)
BASOPHILS # BLD AUTO: 0.03 X10*3/UL (ref 0–0.1)
BASOPHILS NFR BLD AUTO: 0.4 %
BILIRUB SERPL-MCNC: 0.3 MG/DL (ref 0–1.2)
BUN SERPL-MCNC: 18 MG/DL (ref 6–23)
CALCIUM SERPL-MCNC: 9.4 MG/DL (ref 8.6–10.6)
CHLORIDE SERPL-SCNC: 103 MMOL/L (ref 98–107)
CO2 SERPL-SCNC: 24 MMOL/L (ref 21–32)
CREAT SERPL-MCNC: 0.66 MG/DL (ref 0.5–1.05)
EGFRCR SERPLBLD CKD-EPI 2021: >90 ML/MIN/1.73M*2
EOSINOPHIL # BLD AUTO: 0.06 X10*3/UL (ref 0–0.7)
EOSINOPHIL NFR BLD AUTO: 0.7 %
ERYTHROCYTE [DISTWIDTH] IN BLOOD BY AUTOMATED COUNT: 12.8 % (ref 11.5–14.5)
GLUCOSE SERPL-MCNC: 127 MG/DL (ref 74–99)
HCT VFR BLD AUTO: 36.1 % (ref 36–46)
HGB BLD-MCNC: 12.2 G/DL (ref 12–16)
IMM GRANULOCYTES # BLD AUTO: 0.03 X10*3/UL (ref 0–0.7)
IMM GRANULOCYTES NFR BLD AUTO: 0.4 % (ref 0–0.9)
LYMPHOCYTES # BLD AUTO: 1.7 X10*3/UL (ref 1.2–4.8)
LYMPHOCYTES NFR BLD AUTO: 20.1 %
MCH RBC QN AUTO: 30.5 PG (ref 26–34)
MCHC RBC AUTO-ENTMCNC: 33.8 G/DL (ref 32–36)
MCV RBC AUTO: 90 FL (ref 80–100)
MONOCYTES # BLD AUTO: 0.56 X10*3/UL (ref 0.1–1)
MONOCYTES NFR BLD AUTO: 6.6 %
NEUTROPHILS # BLD AUTO: 6.08 X10*3/UL (ref 1.2–7.7)
NEUTROPHILS NFR BLD AUTO: 71.8 %
NRBC BLD-RTO: 0 /100 WBCS (ref 0–0)
PLATELET # BLD AUTO: 365 X10*3/UL (ref 150–450)
POTASSIUM SERPL-SCNC: 3.5 MMOL/L (ref 3.5–5.3)
PROT SERPL-MCNC: 7.2 G/DL (ref 6.4–8.2)
RBC # BLD AUTO: 4 X10*6/UL (ref 4–5.2)
SODIUM SERPL-SCNC: 138 MMOL/L (ref 136–145)
WBC # BLD AUTO: 8.5 X10*3/UL (ref 4.4–11.3)

## 2024-04-20 PROCEDURE — 80053 COMPREHEN METABOLIC PANEL: CPT | Performed by: EMERGENCY MEDICINE

## 2024-04-20 PROCEDURE — G0378 HOSPITAL OBSERVATION PER HR: HCPCS

## 2024-04-20 PROCEDURE — 96374 THER/PROPH/DIAG INJ IV PUSH: CPT

## 2024-04-20 PROCEDURE — 85025 COMPLETE CBC W/AUTO DIFF WBC: CPT | Performed by: EMERGENCY MEDICINE

## 2024-04-20 PROCEDURE — 2500000001 HC RX 250 WO HCPCS SELF ADMINISTERED DRUGS (ALT 637 FOR MEDICARE OP): Mod: SE | Performed by: STUDENT IN AN ORGANIZED HEALTH CARE EDUCATION/TRAINING PROGRAM

## 2024-04-20 PROCEDURE — 36415 COLL VENOUS BLD VENIPUNCTURE: CPT | Performed by: EMERGENCY MEDICINE

## 2024-04-20 PROCEDURE — 97161 PT EVAL LOW COMPLEX 20 MIN: CPT | Mod: GP

## 2024-04-20 PROCEDURE — 97165 OT EVAL LOW COMPLEX 30 MIN: CPT | Mod: GO

## 2024-04-20 PROCEDURE — 99222 1ST HOSP IP/OBS MODERATE 55: CPT | Performed by: NURSE PRACTITIONER

## 2024-04-20 PROCEDURE — 2500000004 HC RX 250 GENERAL PHARMACY W/ HCPCS (ALT 636 FOR OP/ED): Mod: SE | Performed by: EMERGENCY MEDICINE

## 2024-04-20 PROCEDURE — 2500000001 HC RX 250 WO HCPCS SELF ADMINISTERED DRUGS (ALT 637 FOR MEDICARE OP): Mod: SE | Performed by: NURSE PRACTITIONER

## 2024-04-20 RX ORDER — FLUTICASONE PROPIONATE 110 UG/1
2 AEROSOL, METERED RESPIRATORY (INHALATION)
COMMUNITY

## 2024-04-20 RX ORDER — PANTOPRAZOLE SODIUM 40 MG/1
40 TABLET, DELAYED RELEASE ORAL
Status: ON HOLD | COMMUNITY
End: 2024-04-22

## 2024-04-20 RX ORDER — ACETAMINOPHEN 500 MG
10 TABLET ORAL NIGHTLY PRN
Status: DISCONTINUED | OUTPATIENT
Start: 2024-04-20 | End: 2024-04-22 | Stop reason: HOSPADM

## 2024-04-20 RX ORDER — RISPERIDONE 1 MG/1
1 TABLET ORAL NIGHTLY
COMMUNITY

## 2024-04-20 RX ORDER — ALBUTEROL SULFATE 90 UG/1
2 AEROSOL, METERED RESPIRATORY (INHALATION) EVERY 4 HOURS PRN
COMMUNITY

## 2024-04-20 RX ORDER — ACETAMINOPHEN, DIPHENHYDRAMINE HCL, PHENYLEPHRINE HCL 325; 25; 5 MG/1; MG/1; MG/1
1 TABLET ORAL NIGHTLY PRN
COMMUNITY

## 2024-04-20 RX ORDER — PANTOPRAZOLE SODIUM 40 MG/1
40 TABLET, DELAYED RELEASE ORAL
Status: DISCONTINUED | OUTPATIENT
Start: 2024-04-20 | End: 2024-04-22 | Stop reason: HOSPADM

## 2024-04-20 RX ORDER — CYCLOBENZAPRINE HCL 10 MG
5 TABLET ORAL 3 TIMES DAILY PRN
Status: DISCONTINUED | OUTPATIENT
Start: 2024-04-20 | End: 2024-04-22 | Stop reason: HOSPADM

## 2024-04-20 RX ORDER — MELOXICAM 7.5 MG/1
7.5 TABLET ORAL DAILY
Status: DISCONTINUED | OUTPATIENT
Start: 2024-04-20 | End: 2024-04-21

## 2024-04-20 RX ORDER — ACETAMINOPHEN 325 MG/1
975 TABLET ORAL EVERY 8 HOURS
Status: DISCONTINUED | OUTPATIENT
Start: 2024-04-20 | End: 2024-04-22 | Stop reason: HOSPADM

## 2024-04-20 RX ORDER — ACETAMINOPHEN 500 MG
5000 TABLET ORAL DAILY
COMMUNITY

## 2024-04-20 RX ORDER — POLYETHYLENE GLYCOL 3350 17 G/17G
17 POWDER, FOR SOLUTION ORAL DAILY PRN
Status: DISCONTINUED | OUTPATIENT
Start: 2024-04-20 | End: 2024-04-22 | Stop reason: HOSPADM

## 2024-04-20 RX ORDER — KETOROLAC TROMETHAMINE 30 MG/ML
30 INJECTION, SOLUTION INTRAMUSCULAR; INTRAVENOUS ONCE
Status: COMPLETED | OUTPATIENT
Start: 2024-04-20 | End: 2024-04-20

## 2024-04-20 RX ORDER — DICLOFENAC SODIUM 10 MG/G
4 GEL TOPICAL 2 TIMES DAILY
Status: DISCONTINUED | OUTPATIENT
Start: 2024-04-20 | End: 2024-04-22 | Stop reason: HOSPADM

## 2024-04-20 RX ORDER — CYCLOBENZAPRINE HCL 10 MG
10 TABLET ORAL 3 TIMES DAILY PRN
COMMUNITY
End: 2024-04-22 | Stop reason: HOSPADM

## 2024-04-20 RX ADMIN — ACETAMINOPHEN 975 MG: 325 TABLET ORAL at 10:32

## 2024-04-20 RX ADMIN — PANTOPRAZOLE SODIUM 40 MG: 40 TABLET, DELAYED RELEASE ORAL at 06:22

## 2024-04-20 RX ADMIN — CYCLOBENZAPRINE HYDROCHLORIDE 5 MG: 10 TABLET, FILM COATED ORAL at 14:32

## 2024-04-20 RX ADMIN — KETOROLAC TROMETHAMINE 30 MG: 30 INJECTION, SOLUTION INTRAMUSCULAR at 02:04

## 2024-04-20 RX ADMIN — ACETAMINOPHEN 975 MG: 325 TABLET ORAL at 18:12

## 2024-04-20 RX ADMIN — MELOXICAM 7.5 MG: 7.5 TABLET ORAL at 14:22

## 2024-04-20 SDOH — SOCIAL STABILITY: SOCIAL INSECURITY: ARE THERE ANY APPARENT SIGNS OF INJURIES/BEHAVIORS THAT COULD BE RELATED TO ABUSE/NEGLECT?: NO

## 2024-04-20 SDOH — SOCIAL STABILITY: SOCIAL INSECURITY: ABUSE: ADULT

## 2024-04-20 SDOH — SOCIAL STABILITY: SOCIAL INSECURITY: DOES ANYONE TRY TO KEEP YOU FROM HAVING/CONTACTING OTHER FRIENDS OR DOING THINGS OUTSIDE YOUR HOME?: YES

## 2024-04-20 SDOH — SOCIAL STABILITY: SOCIAL INSECURITY: HAVE YOU HAD THOUGHTS OF HARMING ANYONE ELSE?: YES

## 2024-04-20 SDOH — SOCIAL STABILITY: SOCIAL INSECURITY: HAS ANYONE EVER THREATENED TO HURT YOUR FAMILY OR YOUR PETS?: YES

## 2024-04-20 SDOH — SOCIAL STABILITY: SOCIAL INSECURITY: ARE YOU OR HAVE YOU BEEN THREATENED OR ABUSED PHYSICALLY, EMOTIONALLY, OR SEXUALLY BY ANYONE?: YES

## 2024-04-20 SDOH — SOCIAL STABILITY: SOCIAL INSECURITY: DO YOU FEEL ANYONE HAS EXPLOITED OR TAKEN ADVANTAGE OF YOU FINANCIALLY OR OF YOUR PERSONAL PROPERTY?: NO

## 2024-04-20 SDOH — SOCIAL STABILITY: SOCIAL INSECURITY: DO YOU FEEL UNSAFE GOING BACK TO THE PLACE WHERE YOU ARE LIVING?: NO

## 2024-04-20 SDOH — SOCIAL STABILITY: SOCIAL INSECURITY: WERE YOU ABLE TO COMPLETE ALL THE BEHAVIORAL HEALTH SCREENINGS?: NO

## 2024-04-20 SDOH — SOCIAL STABILITY: SOCIAL INSECURITY: HAVE YOU HAD ANY THOUGHTS OF HARMING ANYONE ELSE?: YES

## 2024-04-20 ASSESSMENT — LIFESTYLE VARIABLES
HOW OFTEN DO YOU HAVE 6 OR MORE DRINKS ON ONE OCCASION: NEVER
HOW OFTEN DO YOU HAVE A DRINK CONTAINING ALCOHOL: NEVER
EVER HAD A DRINK FIRST THING IN THE MORNING TO STEADY YOUR NERVES TO GET RID OF A HANGOVER: NO
HAVE PEOPLE ANNOYED YOU BY CRITICIZING YOUR DRINKING: NO
HOW MANY STANDARD DRINKS CONTAINING ALCOHOL DO YOU HAVE ON A TYPICAL DAY: PATIENT DOES NOT DRINK
TOTAL SCORE: 0
AUDIT-C TOTAL SCORE: 0
SKIP TO QUESTIONS 9-10: 1
HAVE YOU EVER FELT YOU SHOULD CUT DOWN ON YOUR DRINKING: NO
AUDIT-C TOTAL SCORE: 0
EVER FELT BAD OR GUILTY ABOUT YOUR DRINKING: NO

## 2024-04-20 ASSESSMENT — PAIN DESCRIPTION - DESCRIPTORS: DESCRIPTORS: ACHING

## 2024-04-20 ASSESSMENT — COGNITIVE AND FUNCTIONAL STATUS - GENERAL
DRESSING REGULAR LOWER BODY CLOTHING: A LOT
PERSONAL GROOMING: A LITTLE
DRESSING REGULAR LOWER BODY CLOTHING: A LOT
WALKING IN HOSPITAL ROOM: A LOT
MOVING FROM LYING ON BACK TO SITTING ON SIDE OF FLAT BED WITH BEDRAILS: A LITTLE
HELP NEEDED FOR BATHING: A LITTLE
MOBILITY SCORE: 18
MOVING TO AND FROM BED TO CHAIR: A LITTLE
DRESSING REGULAR LOWER BODY CLOTHING: A LITTLE
HELP NEEDED FOR BATHING: A LOT
DAILY ACTIVITIY SCORE: 14
CLIMB 3 TO 5 STEPS WITH RAILING: A LITTLE
TURNING FROM BACK TO SIDE WHILE IN FLAT BAD: A LITTLE
PERSONAL GROOMING: A LOT
DRESSING REGULAR UPPER BODY CLOTHING: A LOT
DRESSING REGULAR LOWER BODY CLOTHING: A LOT
CLIMB 3 TO 5 STEPS WITH RAILING: A LOT
MOBILITY SCORE: 20
WALKING IN HOSPITAL ROOM: A LOT
HELP NEEDED FOR BATHING: A LOT
DAILY ACTIVITIY SCORE: 19
HELP NEEDED FOR BATHING: A LOT
DRESSING REGULAR UPPER BODY CLOTHING: A LOT
WALKING IN HOSPITAL ROOM: A LOT
DRESSING REGULAR UPPER BODY CLOTHING: A LITTLE
DRESSING REGULAR UPPER BODY CLOTHING: A LOT
PERSONAL GROOMING: A LOT
MOBILITY SCORE: 20
WALKING IN HOSPITAL ROOM: A LITTLE
TOILETING: A LOT
PERSONAL GROOMING: A LOT
PATIENT BASELINE BEDBOUND: NO
STANDING UP FROM CHAIR USING ARMS: A LITTLE
TOILETING: A LOT
TOILETING: A LITTLE
MOBILITY SCORE: 20
TOILETING: A LOT
DAILY ACTIVITIY SCORE: 14
DAILY ACTIVITIY SCORE: 14

## 2024-04-20 ASSESSMENT — ENCOUNTER SYMPTOMS
FEVER: 0
FLANK PAIN: 0
SHORTNESS OF BREATH: 0
HEMATURIA: 0
BACK PAIN: 1
ABDOMINAL PAIN: 0
FREQUENCY: 0
ARTHRALGIAS: 1
DIARRHEA: 0
PALPITATIONS: 0
CHILLS: 0
DYSURIA: 0
VOMITING: 0
NAUSEA: 0
CONSTIPATION: 0

## 2024-04-20 ASSESSMENT — ACTIVITIES OF DAILY LIVING (ADL)
ADL_ASSISTANCE: INDEPENDENT
JUDGMENT_ADEQUATE_SAFELY_COMPLETE_DAILY_ACTIVITIES: YES
BATHING: NEEDS ASSISTANCE
WALKS IN HOME: NEEDS ASSISTANCE
ADEQUATE_TO_COMPLETE_ADL: YES
HEARING - RIGHT EAR: FUNCTIONAL
TOILETING: NEEDS ASSISTANCE
ADL_ASSISTANCE: INDEPENDENT
PATIENT'S MEMORY ADEQUATE TO SAFELY COMPLETE DAILY ACTIVITIES?: YES
FEEDING YOURSELF: NEEDS ASSISTANCE
BATHING_ASSISTANCE: MINIMAL
DRESSING YOURSELF: NEEDS ASSISTANCE
HEARING - LEFT EAR: FUNCTIONAL
GROOMING: NEEDS ASSISTANCE

## 2024-04-20 ASSESSMENT — PAIN DESCRIPTION - LOCATION: LOCATION: KNEE

## 2024-04-20 ASSESSMENT — PAIN - FUNCTIONAL ASSESSMENT
PAIN_FUNCTIONAL_ASSESSMENT: 0-10

## 2024-04-20 ASSESSMENT — PATIENT HEALTH QUESTIONNAIRE - PHQ9
SUM OF ALL RESPONSES TO PHQ9 QUESTIONS 1 & 2: 0
1. LITTLE INTEREST OR PLEASURE IN DOING THINGS: NOT AT ALL
2. FEELING DOWN, DEPRESSED OR HOPELESS: NOT AT ALL

## 2024-04-20 ASSESSMENT — PAIN SCALES - GENERAL
PAINLEVEL_OUTOF10: 3
PAINLEVEL_OUTOF10: 1
PAINLEVEL_OUTOF10: 8
PAINLEVEL_OUTOF10: 1
PAINLEVEL_OUTOF10: 5 - MODERATE PAIN
PAINLEVEL_OUTOF10: 5 - MODERATE PAIN

## 2024-04-20 ASSESSMENT — PAIN DESCRIPTION - PAIN TYPE: TYPE: CHRONIC PAIN

## 2024-04-20 NOTE — ED TRIAGE NOTES
Left knee pain denies trauma or injury, pt states that she has a MRI scheduled on 4/30, also c/o  lower back pain denies any trauma, injury, urinary symptoms,

## 2024-04-20 NOTE — PROGRESS NOTES
Physical Therapy    Physical Therapy Evaluation    Patient Name: Scar Prince  MRN: [id number]  Today's Date: 4/20/2024   Time Calculation  Start Time: 0842  Stop Time: 0854  Time Calculation (min): 12 min    Assessment/Plan   PT Assessment  PT Assessment Results: Decreased strength, Decreased endurance, Impaired balance, Decreased mobility, Pain  Rehab Prognosis: Good  Evaluation/Treatment Tolerance: Patient limited by pain  Medical Staff Made Aware: Yes  End of Session Communication: Bedside nurse  Assessment Comment: Pt is a 54 year old female who presents for knee pain. Pt deficits for strength, endurance, balance leading to impairment in function mobility.  End of Session Patient Position: Bed, 3 rail up, Alarm off, not on at start of sessionDictation #1  MRN:[id number]  [facility]:[id number]   IP OR SWING BED PT PLAN  Inpatient or Swing Bed: Inpatient  PT Plan  Treatment/Interventions: Bed mobility, Transfer training, Gait training, Stair training, Balance training, Neuromuscular re-education, Endurance training, Strengthening, Range of motion, Therapeutic exercise, Home exercise program, Therapeutic activity, Positioning, Postural re-education  PT Plan: Skilled PT  PT Frequency: 3 times per week  PT Discharge Recommendations: Low intensity level of continued care  Equipment Recommended upon Discharge:  (front wheeled walker)  PT Recommended Transfer Status: Assist x1      Subjective   General Visit Information:  General  Reason for Referral: presented to the ED for left knee pain  Past Medical History Relevant to Rehab: OA of the bilateral knees and carpal tunnel syndrome  Family/Caregiver Present: No  Co-Treatment: OT  Co-Treatment Reason: To maximize pt safety, function, and mobility  Prior to Session Communication: Bedside nurse, Physician (cleared for therapy)  Patient Position Received: Bed, 3 rail up, Alarm off, not on at start of session  General Comment: Pt is pleasant, cooperative, and willing to partivipate  in therapy.  Home Living:  Home Living  Type of Home:  (Treatment center)  Lives With: Alone  Home Layout: Multi-level  Home Access: Stairs to enter with rails  Entrance Stairs-Number of Steps: 4  Bathroom Shower/Tub: Walk-in shower  Bathroom Toilet: Standard  Prior Level of Function:  Prior Function Per Pt/Caregiver Report  Level of [place]: Independent with ADLs and functional transfers, Independent with homemaking with ambulation  ADL Assistance: Independent  Homemaking Assistance: Independent  Ambulatory Assistance: Independent  Leisure: enjoys playing cards  Prior Function Comments: no hx of falls  Precautions:  Precautions  LE Weight Bearing Status:  (WBAT per Claritza Bojorquez, DO per ortho)  Medical Precautions: Fall precautions  Vital Signs:       Objective   Pain:  Pain Assessment  Pain Assessment: 0-10  Pain Score: 5 - Moderate pain  Pain Type: Acute pain  Pain Location:  (L knee)  Cognition:  Cognition  Overall Cognitive Status: Within Functional Limits  Orientation Level: Oriented X4  Impulsive: Mildly    General Assessments:  Activity Tolerance  Endurance: Tolerates 10 - 20 min exercise with multiple rests    Sensation  Sensation Comment: denies numbness and tingling at this time    Functional Assessments:  Bed Mobility  Bed Mobility: Yes  Bed Mobility 1  Bed Mobility 1: Supine to sitting, Sitting to supine  Level of Assistance 1:  (SBA)  Bed Mobility Comments 1: VCs, increased time to perform    Transfers  Transfer: Yes  Transfer 1  Transfer From 1: Sit to, Stand to  Transfer to 1: Stand, Sit  Technique 1: Sit to stand, Stand to sit  Transfer Device 1: Walker  Transfer Level of Assistance 1: Contact guard, Moderate verbal cues  Trials/Comments 1: VCs, x2 trials, increased time to perform, cues for hip and knee extension    Ambulation/Gait Training  Ambulation/Gait Training Performed: Yes  Ambulation/Gait Training 1  Surface 1: Level tile  Device 1: Rolling walker  Assistance 1: Contact guard, Moderate  verbal cues  Quality of Gait 1: Decreased step length, Inconsistent stride length, Soft knee(s) (decreased foot clearance)  Comments/Distance (ft) 1: 25 ft x2    Stairs  Stairs: No  Extremity/Trunk Assessments:  RLE   RLE :  (>/= 3+/5 observed via function)  LLE   LLE :  (>/= 3+/5 observed via function)  Outcome Measures:  [facility] Basic Mobility  Turning from your back to your side while in a flat bed without using bedrails: A little  Moving from lying on your back to sitting on the side of a flat bed without using bedrails: A little  Moving to and from bed to chair (including a wheelchair): A little  Standing up from a chair using your arms (e.g. wheelchair or bedside chair): A little  To walk in hospital room: A little  Climbing 3-5 steps with railing: A little  Basic Mobility - Total Score: 18    Encounter Problems       Encounter Problems (Active)       Mobility       STG - Patient will ambulate >150 ft with mod I and LRD with no LOB, progress as able and appropriate        Start:  04/20/24    Expected End:  05/04/24            STG - Patient will ascend and descend a flight of stairs independently        Start:  04/20/24    Expected End:  05/04/24            Pt will ambulate 150 ft with mod I and LRD and no signs of fatigue or SOB       Start:  04/20/24    Expected End:  05/04/24               PT Transfers       STG - Patient will perform bed mobility independently        Start:  04/20/24    Expected End:  05/04/24            STG - Patient will transfer sit to and from stand with mod I and LRD       Start:  04/20/24    Expected End:  05/04/24                   Education Documentation  Precautions, taught by Maribell Ni, PT at 4/20/2024  1:32 PM.  Learner: Patient  Readiness: Acceptance  Method: Explanation  Response: Verbalizes Understanding  Comment: bed mobility, transfers, gait, activity and symptom monitoring    Mobility Training, taught by Maribell Ni PT at 4/20/2024  1:32 PM.  Learner:  Patient  Readiness: Acceptance  Method: Explanation  Response: Verbalizes Understanding  Comment: bed mobility, transfers, gait, activity and symptom monitoring    Education Comments  No comments found.        04/20/24 at 1:34 PM - Maribell Ni, PT

## 2024-04-20 NOTE — PROGRESS NOTES
Pharmacy Medication History Review    Scar Prince is a 54 y.o. female admitted for Ambulatory dysfunction. Pharmacy reviewed the patient's [id number] medications and allergies for accuracy.    The list below reflects the updated PTA list. Comments regarding how patient may be taking medications differently can be found in the Admit Orders Activity.  Prior to Admission Medications   Prescriptions Last Dose Informant   albuterol (Ventolin HFA) 90 mcg/actuation inhaler  Self, Other   Sig: Inhale 2 puffs every 4 hours if needed for wheezing.   cholecalciferol (Vitamin D3) 5,000 Units tablet Unknown Self, Other   Sig: Take 1 tablet (5,000 Units) by mouth once daily.   cyclobenzaprine (Flexeril) 10 mg tablet Haven't started taking Self, Other   Sig: Take 1 tablet (10 mg) by mouth 3 times a day as needed for muscle spasms.   fluticasone (Flovent HFA) 110 mcg/actuation inhaler Unknown Self, Other   Sig: Inhale 2 puffs 2 times a day. Rinse mouth with water after use to reduce aftertaste and incidence of candidiasis. Do not swallow.   ketorolac (Toradol) 10 mg tablet Unknown Self, Other   Sig: Take 1 tablet (10 mg) by mouth every 6 hours if needed for moderate pain (4 - 6) for up to 5 days.   melatonin 10 mg tablet Unknown Self, Other   Sig: Take by mouth as needed at bedtime.   pantoprazole (ProtoNix) 40 mg EC tablet Unknown Self, Other   Sig: Take 1 tablet (40 mg) by mouth once daily in the morning. Take before meals. Do not crush, chew, or split.   risperiDONE (RisperDAL) 1 mg tablet 4/19/2024 Self, Other   Sig: Take 1 tablet (1 mg) by mouth once daily at bedtime.      Facility-Administered Medications: None        The list below reflects the updated allergy list. Please review each documented allergy for additional clarification and justification.  Allergies  Reviewed by Adilene Reeves, PharmD on 4/20/2024   No Known Allergies         Patient accepts M2B at discharge. Pharmacy has been updated to [facility]  [facility].    Sources used to complete the med history include   - out patient fill history, OARRS, and patient interview  - Patient (good historian)      Below are additional concerns with the patient's PTA list.  - Patient reported she haven't started taking her cyclobenzaprine yet (Haven't picked it up from pharmacy)    Adilene Reeves, PharmD  PGY-1 Pharmacy Resident  [facility]  Please reach out via [employer] Secure Chat for questions, or if no response call [employer] or [place]”

## 2024-04-20 NOTE — H&P
"History Of Present Illness  Scar Prince is a 54 y.o. female with past medical history of OA of the bilateral knees and carpal tunnel syndrome who presented to the ED for left knee pain. Notably, she is followed by ortho and recently received cortisone injections for her knees and was scheduled for an MRI on 4/30. She denies any recent injury or trauma. She also endorses lower back pain that is chronic in nature (per EMR). She denies any fever, chills, chest pain, numbness/tingling, or cauda equina symptoms. She reports that 5 months ago she was started on meloxicam which improved her back pain until 3 days ago, and notes she has not used the meloxicam recently. On exam in ED[facility], she reports no change since arrival. She reports she stop taking the meloxicam as she was told she no longer needed it. She last received cortisone injections in her knees around 6 weeks ago which she reports being totally ineffective, however typically they were effective. She is a current smoker, former EtOH dependent (5 months sober), denies illicit substance use.      Past Medical History  No past medical history on file.    Surgical History  No past surgical history on file.     Social History  She reports that she has been smoking cigarettes. She has quit using smokeless tobacco. She reports current drug use. Drugs: \"Crack\" cocaine and Marijuana. No history on file for alcohol use.    Family History  No family history on file.     Allergies  Patient has no known allergies.    Review of Systems   Constitutional:  Negative for chills and fever.   Respiratory:  Negative for shortness of breath.    Cardiovascular:  Negative for chest pain and palpitations.   Gastrointestinal:  Negative for abdominal pain, constipation, diarrhea, nausea and vomiting.   Genitourinary:  Negative for dysuria, flank pain, frequency, hematuria and urgency.   Musculoskeletal:  Positive for arthralgias, back pain and gait problem.   All other systems reviewed " "and are negative.       Physical Exam  Vitals reviewed.   Constitutional:       Appearance: She is obese.   HENT:      Head: Normocephalic and atraumatic.   Cardiovascular:      Rate and Rhythm: Normal rate and regular rhythm.      Heart sounds: Normal heart sounds.   Pulmonary:      Effort: Pulmonary effort is normal.      Breath sounds: Normal air entry.   Abdominal:      General: Bowel sounds are normal.      Palpations: Abdomen is soft.      Tenderness: There is no abdominal tenderness.   Musculoskeletal:         General: No deformity.   Skin:     General: Skin is warm and dry.   Neurological:      General: No focal deficit present.      Mental Status: She is alert and oriented to person, place, and time.   Psychiatric:         Mood and Affect: Mood normal.         Behavior: Behavior normal.          Last Recorded Vitals  Blood pressure 111/82, pulse 93, temperature 35.8 °C (96.4 °F), temperature source Temporal, resp. rate 18, height 1.676 m (5' 6\"), weight 109 kg (240 lb), SpO2 98%.    Relevant Results      Lab Results   Component Value Date    WBC 8.5 04/20/2024    HGB 12.2 04/20/2024    HCT 36.1 04/20/2024    MCV 90 04/20/2024     04/20/2024     CT lumbar spine wo IV contrast  Narrative: Interpreted By:  Pee Avila and Barbat Antonio   STUDY:  CT LUMBAR SPINE WO IV CONTRAST;  4/19/2024 10:18 pm      INDICATION:  Signs/Symptoms:midline low back pain; acute on chronic- no bowel or  bladder changes.      COMPARISON:  None.      ACCESSION NUMBER(S):  [id number]      ORDERING CLINICIAN:  TAHMINA ESPINOZA      TECHNIQUE:  Axial CT images of the lumbar spine are obtained. Axial, coronal and  sagittal reconstructions are submitted for review.      FINDINGS:  Fractures: No acute fracture.      Vertebral Alignment: Grade 1 anterolisthesis of L4 on L5.      Vertebrae/Discs: Vertebral body heights  are within normal limits.  There is mild L4-L5 and L5-S1 disc space narrowing. There is moderate  to " severe lower lumbar facet arthropathy most prominently affecting  L3-L4, L4-L5, and L5-S1. Moderate multilevel degenerative changes  predominantly consisting osteophytosis and endplate sclerosis/cystic  change. These findings are most severe at L5-S1, where there is  prominent inferior endplate sclerosis.      Spinal Canal/Neural Foramina: There is mild spinal canal narrowing at  L4-L5 secondary to posterior disc osteophyte complex, ligamentous  redundancy, and facet arthropathy. Mild neural foraminal stenosis at  of the right L4-L5 and left-greater-than-right L5-S1 neural foramina  secondary to facet hypertrophy and foraminal osteophytosis.      Paraspinal Soft Tissues: Within normal limits.      Other: No significant abnormality of the visualized abdomen and  pelvis.      Impression: 1. No acute osseous abnormality.  2. Moderate to severe multilevel degenerative change, as described  above.  3. Mild neural foraminal stenosis of the right L4-L5 and  left-greater-than-right L5-S1 neural foramina.      Signed by: Pee Avila 4/20/2024 1:14 AM  Dictation workstation:   [id number]    ED Medication Administration from 04/19/2024 1940 to 04/20/2024 0242         Date/Time Order Dose Route Action Action by     04/19/2024 2112 EDT cyclobenzaprine (Flexeril) tablet 10 mg 10 mg oral Given JULIANNE Chaudhry     04/20/2024 0204 EDT ketorolac (Toradol) injection 30 mg 30 mg intravenous Given JUAQUIN Prasad               Assessment/Plan   Active Problems:  There are no active Hospital Problems.      #Ambulatory dysfunction  #Bilateral knee pain  #Lower back pain  -CT -ve for acute process  -Falls precautions  -Scheduled tylenol, meloxicam  -PPI for GI ppx  -PT for possible placement  -MRI spine    #Nicotine dependence  -Declined nicotine patch  -Encourage cessation           Vernon Vásquez, APRN-CNP

## 2024-04-20 NOTE — ED PROVIDER NOTES
Emergency Department Encounter  [facility] EMERGENCY MEDICINE    Patient: Scar Prince  MRN: [id number]  : 1970  Date of Evaluation: 2024  ED Provider: Annie Batres PA-C      Chief Complaint       Chief Complaint   Patient presents with    Knee Pain    Back Pain     HPI    Scar Prince is a 54 y.o. female who presents to the emergency department presenting to the ED for exacerbation of her chronic left knee pain and lower back pain for the past 3 days. She states that she recently returned to work 3 days ago and after 6 hours of being on her feet at work, she developed the flare up of her back and knee pain. She rates the knee pain as a 10/10 and states the knee and back pain is sharp/shooting. She has been using toradol, lidocaine patches, and icy hot without relief of her pains. She reports difficulty getting around due to her symptoms and pain. She denies any trauma, falls, or injuries. She states that in February she was seen for her knee pain and imaging showed osteoarthritis. She reports that the doctor also told her that she had a growth in the left knee and she was scheduled to receive an outpatient MRI on . She has since received a cortisone shot in that knee and was given toradol during that ED visit. She states that 5 months ago she had lower back pain and was prescribed meloxicam with resolution of the back pain up until 3 days ago. She has not used the meloxicam recently and denies recent imaging of her spine. She also reports some paresthesias from the knee down. She has  PMH of asthma and uses an inhaler BID. Denies hx of kidney disease or diabetes. Denies any rashes or erythema in the lower extremities. She denies any bruising of the left knee. No numbness, saddle anesthesia, or urinary or bowel incontinence. She denies a hx of recent trauma. She does follow with an orthopedic specialist for her spine and for her knee. No headache, dizziness, vision  "changes, neck pain, neck stiffness, chest pain, shortness of breath, nausea, vomiting, abdominal pain, diarrhea, urinary symptoms, fevers, or chills. No recent travels. No prolonged periods of immobilization.     ROS:     Review of Systems  14 systems reviewed and otherwise acutely negative except as in the HPI.    Past History   No past medical history on file.  No past surgical history on file.  Social History     Socioeconomic History    Marital status:      Spouse name: Not on file    Number of children: Not on file    Years of education: Not on file    Highest education level: Not on file   Occupational History    Not on file   Tobacco Use    Smoking status: Every Day     Types: Cigarettes    Smokeless tobacco: Former   Substance and Sexual Activity    Alcohol use: Not on file    Drug use: Yes     Types: \"Crack\" cocaine, Marijuana     Comment: Currently sober for 13 days    Sexual activity: Not on file   Other Topics Concern    Not on file   Social History Narrative    Not on file     Social Determinants of Health     Financial Resource Strain: At Risk (3/10/2022)    Received from [facility]     Financial Resource Strain     Financial Resource Strain: 2   Food Insecurity: Unknown (12/5/2023)    Received from [facility]    Hunger Vital Sign     Worried About Running Out of Food in the Last Year: Never true     Ran Out of Food in the Last Year: Not on file   Transportation Needs: At Risk (3/10/2022)    Received from [facility]     Transportation Needs     Transportation: 2   Physical Activity: Not on File (8/26/2019)    Received from [facility]     Physical Activity     Physical Activity: 0   Stress: At Risk (3/10/2022)    Received from [facility]     Stress     Stress: 2   Social Connections: Not on File (8/26/2019)    Received from [facility]     Social Connections     Social Connections and Isolation: 0   Intimate Partner Violence: Not on file   Housing Stability: Not at Risk (3/10/2022)    Received from [facility]     " Housing Stability     Housin       Medications/Allergies     Previous Medications    IBUPROFEN 600 MG TABLET    Take 1 tablet (600 mg) by mouth every 8 hours if needed for mild pain (1 - 3).    KETOROLAC (TORADOL) 10 MG TABLET    Take 1 tablet (10 mg) by mouth every 6 hours if needed for moderate pain (4 - 6) for up to 5 days.     No Known Allergies     Physical Exam       ED Triage Vitals [24]   Temperature Heart Rate Respirations BP   35.8 °C (96.4 °F) 93 18 111/82      Pulse Ox Temp Source Heart Rate Source Patient Position   98 % Temporal -- --      BP Location FiO2 (%)     -- --         Physical Exam    Physical Exam: Patient is seated in a wheelchair.    VS: As documented in the triage note and EMR flowsheet from this visit were reviewed.    Appearance: Alert, oriented, cooperative, in no acute distress. Well nourished & well hydrated.    Head: normocephalic, atraumatic    Skin: Atraumatic. Warm, intact and dry. No lesions, rash, or petechiae. No erythema.     Eyes: Bilateral conjunctivae pink without injection or exudates.     Neck: No midline or paraspinal tenderness. Supple, full ROM intact. No step-offs or deformities.     Pulmonary: Clear to auscultation bilaterally with good chest wall excursion. No rales, rhonchi or wheezing. No accessory muscle use or stridor. Nonlabored breathing, no supplemental oxygen.     Cardiac: Normal S1, S2 without murmur, rub, gallop or extrasystole.     Musculoskeletal: Midline tenderness at L4-L5. TTP diffusely in the lower back. No midline tenderness of the thoracic spine.  No step-offs or deformities. Extremities warm and well-perfused. Dorsal and pedal pulses full and equal. No increased warmth in the lower extremities. Mild swelling of the left knee present. Patient has ROM intact of all extremities. Patient has a lidocaine patch placed near the left knee. Limited mobility of the left lower extremity due to pain and patient had difficulty standing up due  "to back pain. Patient cannot fully stand straight up. 5/5 in the right lower extremity. 5/5 strength in the left thigh. Diminished strength with extension of the left leg and with dorsiflexion. Diminished strength appears due to patient's pain. No overlying erythema or increased warmth of affected knee. Calves soft and nontender. 2+ pulses throughout.     Neurological: Sensation intact to light touch throughout. Sensation grossly intact to sharp and dull stimuli to bilateral lower extremities. 5/5 strength RLE, 4/5 strength LLE d/t elicited knee pain. Strength 5/5 in BUE. Cranial nerves 2-12 intact.     Psychiatric: Appropriate mood and affect. Kempt appearance.       Diagnostics   Radiographs:  CT lumbar spine wo IV contrast               ED Course   Visit Vitals  /82   Pulse 93   Temp 35.8 °C (96.4 °F) (Temporal)   Resp 18   Ht 1.676 m (5' 6\")   Wt 109 kg (240 lb)   SpO2 98%   BMI 38.74 kg/m²   Smoking Status Every Day   BSA 2.25 m²     Medications   cyclobenzaprine (Flexeril) tablet 10 mg (10 mg oral Given 4/19/24 2112)       Medical Decision Making   In the Emergency Department, hospital records were reviewed.  The patient is afebrile with stable vital signs. The patient is in no respiratory distress, satting well on room air. Patient is neurovascularly intact. Based on the patient's history of chronic back pain and previous x-ray imaging on 12/20/23 showing grade 1 anterolisthesis at L4-5, will obtain a CT w/o IV contrast of the lumbar spine to rule out any acute abnormalities. Patient was offered but declined an xray of her left knee as she states that she has had imaging of that knee recently and denies any trauma, falls, or injuries. Will provide a muscle relaxant in the ED to help alleviate symptoms.   CT lumbar spine showed no acute findings. The patient does have degenerative changes and mild neural foraminal stenosis of the right L4-L5 and left-greater-than-right L5-S1 neural foramina. Initially " plan was to discharge patient with script for flexeril -  patient encouraged to follow up with her PCP, spine physician. Encouraged to keep MRI of her knee on 4/30.  However, the patient reported too much difficulty ambulating and performing her ADLs due to her acute on chronic pain. Patient did not feel comfortable being discharged home. Thus, we will obtain basic labs for medical clearance, and will admit the patient to medicine service for PT/OT/SNF placement.   Staffed with supervising physician, Jaye Rich MD, who agrees with workup and treatment plan.    Final Impression      1. Acute midline low back pain without sciatica          DISPOSITION  Disposition: admitted  Patient condition is: Stable    Comment: Please note this report has been produced using speech recognition software and may contain errors related to that system including errors in grammar, punctuation, and spelling, as well as words and phrases that may be inappropriate.  If there are any questions or concerns please feel free to contact the dictating provider for clarification.    KANDY Chun PA-C  04/20/24 0244    This patient was seen by the advanced practice provider.  I have personally performed a substantive portion of the encounter.  I have seen and examined the patient; agree with the workup, evaluation, MDM, management and diagnosis.  The care plan has been discussed.      I personally saw the patient and made/approved the management plan and take responsibility for the patient management.    The patient was staffed with me by the VIVIANA after the patient had already been in the ED for 4 hours and after the patient told the VIVIANA that she did not feel comfortable being discharged. When I went to evaluate the patient, she was seen sitting calmly in a wheelchair talking on the cell phone. She reports she has had chronic left knee and low back pain for which she has seen orthopedics outpatient. She is  scheduled for outpatient MRI of her left knee in less than 2 weeks. She reports that for the past 3 days, after standing for 6 hours at her job, she has had an exacerbation of her left knee and low back pain. She denies any trauma or falls. She reports that the pain has been so significant that it is causing her difficulty moving around and taking care of her ADLs. She reports that she is currently staying at a drug rehab facility for history of cocaine use. She denies any urinary or bowel incontinence. No numbness. No saddle anesthesia. On exam, the patient is neurologically and neurovascularly intact with no focal deficits. Compartments are soft. Calves are soft and nontender. She does have some swelling and effusion to her left knee but there is no overlying erythema or warmth. With encouragement, she has full ROM intact of all extremities including the left knee. The patient was offered but declined imaging of her left knee, stating that she has already had recent imaging and denying any trauma, falls, or injuries. I ordered the patient a dose of IV toradol to treat her pain. Patient will be admitted to the hospital for further management, PT/OT and possible SNF placement. Patient was agreeable to the plan of care. She remains stable.     MD Jaye Chaves MD  04/20/24 0399

## 2024-04-20 NOTE — CARE PLAN
The patient's goals for the shift include      The clinical goals for the shift include mri      Problem: Pain  Goal: Takes deep breaths with improved pain control throughout the shift  Outcome: Progressing  Goal: Turns in bed with improved pain control throughout the shift  Outcome: Progressing  Goal: Walks with improved pain control throughout the shift  Outcome: Progressing  Goal: Performs ADL's with improved pain control throughout shift  Outcome: Progressing  Goal: Participates in PT with improved pain control throughout the shift  Outcome: Progressing  Goal: Free from opioid side effects throughout the shift  Outcome: Progressing  Goal: Free from acute confusion related to pain meds throughout the shift  Outcome: Progressing

## 2024-04-20 NOTE — SIGNIFICANT EVENT
Nonbillable hospitalist progress note    Patient seen and examined at bedside. Family at bedside as well. Patient reports her pain is controlled with current pain medications. She denies new complaints, including shortness of breath or chest pain. States her main concern is her L knee pain and some swelling associated with it.     L knee pain  Ambulatory dysfunction  -Concern for L meniscal injury vs exacerbation of DJD  -Follows with orthopedic surgery as outpatient; s/p steroid injection of L knee 3/14/24 by Dr. Chappell   -Cont pain control with scheduled Tylenol, Mobic (with PPI for GI ppx), PRN flexeril, BID Voltaren gel  -Will order MRI L knee   -PT/OT evals - [facility]AT    Nicotine dependence  -Declined nicotine patch  -Encourage cessation    Electronically signed by Claritza Casanova DO on 04/20/24 at 12:35 PM

## 2024-04-20 NOTE — DISCHARGE INSTRUCTIONS
CT of your spine is normal appearing, no evidence of fractures or spine impingement  MRI of you knee shows a meniscal tear and fluid collection. Orthopedic surgery did not recommend surgery at this time and said to follow up with your orthopedic doctor     For any [facility] appointment please call [phone].

## 2024-04-20 NOTE — PROGRESS NOTES
Occupational Therapy    Evaluation    Patient Name: Scar Prince  MRN: [id number]  Today's Date: 4/20/2024  Time Calculation  Start Time: 0841  Stop Time: 0854  Time Calculation (min): 13 min        Assessment:  Medical Staff Made Aware: Yes  End of Session Communication: Bedside nurse  End of Session Patient Position: Bed, 3 rail up  Medical Staff Made Aware: Yes  Plan:  Treatment Interventions: ADL retraining, Functional transfer training  OT Frequency: 2 times per week  OT Discharge Recommendations: Low intensity level of continued care  Equipment Recommended upon Discharge:  (shower chair)  OT Recommended Transfer Status: Assist of 1  OT - OK to Discharge: Yes (OT eval complete; refer to discharge recommendations)  Treatment Interventions: ADL retraining, Functional transfer training    Subjective   Current Problem:  1. Acute midline low back pain without sciatica  cyclobenzaprine (Flexeril) 10 mg tablet        General:  General  Reason for Referral: presenting with L knee pain  Past Medical History Relevant to Rehab: OA of the bilateral knees and carpal tunnel syndrome  Family/Caregiver Present: No  Prior to Session Communication: Bedside nurse, Physician  Patient Position Received: Bed, 3 rail up, Alarm off, not on at start of session  General Comment: Pt agreeable to participate in therapy, reporting pain is improving  Precautions:  LE Weight Bearing Status: Weight Bearing as Tolerated  Medical Precautions: Fall precautions  Vital Signs:     Pain:  Pain Assessment  Pain Assessment: 0-10  Pain Score: 5 - Moderate pain  Pain Type: Acute pain  Pain Location:  (L knee)    Objective   Cognition:  Overall Cognitive Status: Within Functional Limits  Orientation Level: Oriented X4  Attention: Within Functional Limits  Problem Solving: Within Functional Limits  Safety/Judgement: Within Functional Limits  Insight: Within function limits  Processing Speed: Within funtional limits           Home Living:  Type of Home:   (Treatment center; plans to return after)  Home Layout: Multi-level  Home Access: Stairs to enter with rails  Bathroom Shower/Tub: Walk-in shower  Bathroom Toilet: Standard  Prior Function:  Level of [place]: Independent with ADLs and functional transfers, Independent with homemaking with ambulation  ADL Assistance: Independent  Homemaking Assistance: Independent  Ambulatory Assistance: Independent  IADL History:  Homemaking Responsibilities: No  Mode of Transportation:  (gets rides from friends)  ADL:  Eating Assistance: Independent  Grooming Assistance:  (contact guard; hand hygiene standing at sink)  Bathing Assistance: Minimal  UE Dressing Assistance: Stand by  LE Dressing Assistance: Minimal  Toileting Assistance with Device: Stand by  Activity Tolerance:  Endurance: Tolerates 10 - 20 min exercise with multiple rests  Bed Mobility/Transfers: Bed Mobility  Bed Mobility: Yes  Bed Mobility 1  Bed Mobility 1: Supine to sitting, Sitting to supine  Level of Assistance 1: Close supervision  Bed Mobility Comments 1: use of bed rails    Transfers  Transfer: Yes  Transfer 1  Transfer From 1: Bed to  Technique 1: Sit to stand, Stand to sit  Transfer Device 1: Walker  Transfer Level of Assistance 1: Contact guard  Transfers 2  Transfer to 2: Toilet  Technique 2: Sit to stand, Stand to sit  Transfer Level of Assistance 2: Contact guard  Trials/Comments 2: use of grab bars      Functional Mobility:  Functional Mobility  Functional Mobility Performed: Yes  Functional Mobility 1  Surface 1: Level tile  Device 1: Rolling walker  Assistance 1: Contact guard  Comments 1: cueing for positioing within device  Sitting Balance:  Static Sitting Balance  Static Sitting-Level of Assistance: Distant supervision  Standing Balance:  Static Standing Balance  Static Standing-Balance Support: Bilateral upper extremity supported  Static Standing-Level of Assistance: Close supervision   Modalities:     Vision:Vision - Basic  Assessment  Current Vision: No visual deficits  Sensation:  Light Touch: No apparent deficits  Strength:  Strength Comments: DANYELLE WFL  Perception:  Inattention/Neglect: Appears intact  Coordination:  Movements are Fluid and Coordinated: Yes  Coordination Comment: WFL   Hand Function:  Gross Grasp: Functional  Coordination: Functional  Extremities: RUE   RUE : Within Functional Limits and LUE   LUE: Within Functional Limits    Outcome Measures:[facility] Daily Activity  Putting on and taking off regular lower body clothing: A little  Bathing (including washing, rinsing, drying): A little  Putting on and taking off regular upper body clothing: A little  Toileting, which includes using toilet, bedpan or urinal: A little  Taking care of personal grooming such as brushing teeth: A little  Eating Meals: None  Daily Activity - Total Score: 19    Education Documentation  Body Mechanics, taught by Alicja Sultana OT at 4/20/2024  1:35 PM.  Learner: Patient  Readiness: Acceptance  Method: Explanation  Response: Needs Reinforcement    Precautions, taught by Alicja Sultana OT at 4/20/2024  1:35 PM.  Learner: Patient  Readiness: Acceptance  Method: Explanation  Response: Needs Reinforcement    ADL Training, taught by Alicja Sultana OT at 4/20/2024  1:35 PM.  Learner: Patient  Readiness: Acceptance  Method: Explanation  Response: Needs Reinforcement    Education Comments  No comments found.        OP EDUCATION:       Goals:  Encounter Problems       Encounter Problems (Active)       ADLs       Patient with complete lower body dressing with independent level of assistance donning pants without a zipper/fasteners with PRN adaptive equipment while edge of bed and/or standing. (Progressing)       Start:  04/20/24    Expected End:  05/04/24               BALANCE       Pt will maintain dynamic standing balance during ADL task with modified independent level of assistance in order to demonstrate decreased risk of falling and improved postural  control. (Progressing)       Start:  04/20/24    Expected End:  05/04/24               MOBILITY       Patient will perform Functional mobility max Household distances/Community Distances with modified independent level of assistance and least restrictive device in order to improve safety and functional mobility. (Progressing)       Start:  04/20/24    Expected End:  05/04/24               TRANSFERS       Patient will complete functional transfer to/from toilet and room chair with least restrictive device with modified independent level of assistance. (Progressing)       Start:  04/20/24    Expected End:  05/04/24

## 2024-04-21 ENCOUNTER — APPOINTMENT (OUTPATIENT)
Dept: RADIOLOGY | Facility: HOSPITAL | Age: 54
End: 2024-04-21
Payer: COMMERCIAL

## 2024-04-21 PROCEDURE — 73721 MRI JNT OF LWR EXTRE W/O DYE: CPT | Mod: LT

## 2024-04-21 PROCEDURE — G0378 HOSPITAL OBSERVATION PER HR: HCPCS

## 2024-04-21 PROCEDURE — 2500000001 HC RX 250 WO HCPCS SELF ADMINISTERED DRUGS (ALT 637 FOR MEDICARE OP): Mod: SE | Performed by: STUDENT IN AN ORGANIZED HEALTH CARE EDUCATION/TRAINING PROGRAM

## 2024-04-21 PROCEDURE — 73721 MRI JNT OF LWR EXTRE W/O DYE: CPT | Mod: LEFT SIDE | Performed by: RADIOLOGY

## 2024-04-21 PROCEDURE — 2500000001 HC RX 250 WO HCPCS SELF ADMINISTERED DRUGS (ALT 637 FOR MEDICARE OP): Mod: SE | Performed by: NURSE PRACTITIONER

## 2024-04-21 PROCEDURE — 99233 SBSQ HOSP IP/OBS HIGH 50: CPT | Performed by: STUDENT IN AN ORGANIZED HEALTH CARE EDUCATION/TRAINING PROGRAM

## 2024-04-21 RX ORDER — NAPROXEN 250 MG/1
375 TABLET ORAL
Status: DISCONTINUED | OUTPATIENT
Start: 2024-04-21 | End: 2024-04-22 | Stop reason: HOSPADM

## 2024-04-21 RX ORDER — NAPROXEN 375 MG/1
375 TABLET ORAL
Qty: 42 TABLET | Refills: 0 | Status: CANCELLED | OUTPATIENT
Start: 2024-04-21 | End: 2024-05-05

## 2024-04-21 RX ADMIN — CYCLOBENZAPRINE HYDROCHLORIDE 5 MG: 10 TABLET, FILM COATED ORAL at 19:44

## 2024-04-21 RX ADMIN — ACETAMINOPHEN 975 MG: 325 TABLET ORAL at 02:10

## 2024-04-21 RX ADMIN — NAPROXEN 375 MG: 250 TABLET ORAL at 19:44

## 2024-04-21 RX ADMIN — ACETAMINOPHEN 975 MG: 325 TABLET ORAL at 18:45

## 2024-04-21 RX ADMIN — ACETAMINOPHEN 975 MG: 325 TABLET ORAL at 10:51

## 2024-04-21 RX ADMIN — CYCLOBENZAPRINE HYDROCHLORIDE 5 MG: 10 TABLET, FILM COATED ORAL at 02:10

## 2024-04-21 RX ADMIN — MELOXICAM 7.5 MG: 7.5 TABLET ORAL at 08:48

## 2024-04-21 ASSESSMENT — PAIN SCALES - GENERAL
PAINLEVEL_OUTOF10: 6
PAINLEVEL_OUTOF10: 10 - WORST POSSIBLE PAIN

## 2024-04-21 ASSESSMENT — COGNITIVE AND FUNCTIONAL STATUS - GENERAL
DRESSING REGULAR UPPER BODY CLOTHING: A LOT
CLIMB 3 TO 5 STEPS WITH RAILING: A LOT
HELP NEEDED FOR BATHING: A LOT
MOBILITY SCORE: 20
PERSONAL GROOMING: A LOT
DAILY ACTIVITIY SCORE: 14
DRESSING REGULAR LOWER BODY CLOTHING: A LOT
WALKING IN HOSPITAL ROOM: A LOT
TOILETING: A LOT

## 2024-04-21 ASSESSMENT — PAIN - FUNCTIONAL ASSESSMENT: PAIN_FUNCTIONAL_ASSESSMENT: 0-10

## 2024-04-21 NOTE — CARE PLAN
The patient's goals for the shift include      The clinical goals for the shift include pt will remain safe and use call light      Problem: Pain  Goal: Takes deep breaths with improved pain control throughout the shift  Outcome: Progressing  Goal: Turns in bed with improved pain control throughout the shift  Outcome: Progressing  Goal: Walks with improved pain control throughout the shift  Outcome: Progressing  Goal: Performs ADL's with improved pain control throughout shift  Outcome: Progressing  Goal: Participates in PT with improved pain control throughout the shift  Outcome: Progressing  Goal: Free from opioid side effects throughout the shift  Outcome: Progressing  Goal: Free from acute confusion related to pain meds throughout the shift  Outcome: Progressing

## 2024-04-21 NOTE — PROGRESS NOTES
Hospitalist Progress Note        Name: Scar Prince  :  1970(54 y.o.)  MRN:  [id number]    Date: 24     SUBJECTIVE     HPI:  This is a 54 y.o. female with past medical history of OA of the bilateral knees and carpal tunnel syndrome, polysubstance use disorder in remission (etOH, crack cocaine), tobacco use who presented to the emergency department with chief complaint of L knee pain and inability to ambulate. Admitted for further pain control.    Interval History:   Vitals and chart notes from overnight reviewed. No acute issues overnight.   Patient seen and evaluated at bedside. She reports ongoing pain and inability to ambulate. She is requesting more pain medication.     Review of Systems:   Other than patient's chronic conditions and those complaints in the history above, the rest of the 10 systems review were done and were negative.     Current medications:  Scheduled Meds:acetaminophen, 975 mg, oral, q8h  diclofenac sodium, 4 g, Topical, BID  naproxen, 375 mg, oral, TID with meals  pantoprazole, 40 mg, oral, Daily before breakfast      Continuous Infusions:   PRN Meds:PRN medications: cyclobenzaprine, melatonin, polyethylene glycol      OBJECTIVE     Vitals:    24 1528 24 2100 24 0008 24 0815   BP: 108/70 118/80 115/79 118/83   BP Location: Right arm      Patient Position: Lying      Pulse: 80 82 75 70   Resp: 16 16 16 15   Temp: 36.4 °C (97.5 °F) 36.6 °C (97.8 °F) 37.6 °C (99.7 °F) 35.4 °C (95.7 °F)   TempSrc: Temporal  Tympanic Temporal   SpO2: 94% 95% 98% 97%   Weight:       Height:          Physical Exam  Vitals and nursing note reviewed.   Constitutional:       General: She is not in acute distress.     Appearance: Normal appearance. She is not ill-appearing or toxic-appearing.   HENT:      Head: Normocephalic and atraumatic.      Nose: Nose normal.      Mouth/Throat:      Mouth: Mucous membranes are moist.   Eyes:      Extraocular Movements: Extraocular movements  intact.   Pulmonary:      Effort: Pulmonary effort is normal. No respiratory distress.   Abdominal:      General: There is no distension.   Musculoskeletal:         General: Swelling (L knee) present.      Cervical back: No rigidity.   Skin:     Coloration: Skin is not pale.      Findings: No bruising.   Neurological:      Mental Status: She is alert and oriented to person, place, and time. Mental status is at baseline.   Psychiatric:         Mood and Affect: Mood normal.         Behavior: Behavior normal.         Labs:   Lab Results   Component Value Date     04/20/2024    K 3.5 04/20/2024     04/20/2024    CO2 24 04/20/2024    BUN 18 04/20/2024    CREATININE 0.66 04/20/2024    GLUCOSE 127 (H) 04/20/2024    CALCIUM 9.4 04/20/2024    PROT 7.2 04/20/2024    BILITOT 0.3 04/20/2024    ALKPHOS 105 04/20/2024    AST 11 04/20/2024    ALT 15 04/20/2024       Lab Results   Component Value Date    WBC 8.5 04/20/2024    HGB 12.2 04/20/2024    HCT 36.1 04/20/2024    MCV 90 04/20/2024     04/20/2024       Imaging:   MR knee left wo IV contrast    Result Date: 4/21/2024  Interpreted By:  Bashir Sheikh, STUDY: MRI of the  left knee without IV contrast;  4/21/2024 2:55 pm   INDICATION: Signs/Symptoms:L knee pain, inability to ambulate, concern for meniscal tear.   COMPARISON: None.   ACCESSION NUMBER(S): [id number]   ORDERING CLINICIAN: ERICK FOX   TECHNIQUE: MR imaging of the left knee was obtained  without IV contrast.   FINDINGS: LIGAMENTS AND TENDONS: The anterior cruciate ligament and the posterior cruciate ligaments are intact. The medial collateral ligament is intact. The lateral collateral ligament, the biceps femoris tendon, the popliteus tendon and the iliotibial band are intact. The quadriceps tendon and the patellar tendon are intact.   MENISCI: There is truncation of the body of the medial meniscus consistent with a radial tear. There is mild extrusion of the body into the medial gutter by 2  mm. The lateral meniscus is intact and without evidence of tear.   JOINTS: There is a deep fissure in the median ridge of the patella. There is mild cartilage thinning in the medial patellar facet and the medial trochlear facet. There is mild cartilage thinning in the medial compartment as well. No full-thickness articular cartilage defect seen. There is a large joint effusion. No Patton's cyst seen..   OSSEOUS STRUCTURES: No focal marrow replacing lesions are identified. There is no fracture.   SOFT TISSUES: There is no muscle atrophy or tear. The common peroneal nerve is intact.       1.  Small radial tear through the inner free edge of the body of the medial meniscus. 2. Large suprapatellar joint effusion. 3. Deep chondral fissure in the median ridge of the patella. Mild cartilage loss in the medial and patellofemoral compartments     I personally reviewed the images/study and I agree with the findings as stated. This study was interpreted at [place], [place].   MACRO: None   Signed by: Bashir Sheikh 4/21/2024 3:53 PM Dictation workstation:   [id number]    CT lumbar spine wo IV contrast    Result Date: 4/20/2024  Interpreted By:  Pee Avila and Barbat Antonio STUDY: CT LUMBAR SPINE WO IV CONTRAST;  4/19/2024 10:18 pm   INDICATION: Signs/Symptoms:midline low back pain; acute on chronic- no bowel or bladder changes.   COMPARISON: None.   ACCESSION NUMBER(S): [id number]   ORDERING CLINICIAN: TAHMINA ESPINOZA   TECHNIQUE: Axial CT images of the lumbar spine are obtained. Axial, coronal and sagittal reconstructions are submitted for review.   FINDINGS: Fractures: No acute fracture.   Vertebral Alignment: Grade 1 anterolisthesis of L4 on L5.   Vertebrae/Discs: Vertebral body heights  are within normal limits. There is mild L4-L5 and L5-S1 disc space narrowing. There is moderate to severe lower lumbar facet arthropathy most prominently affecting L3-L4,  L4-L5, and L5-S1. Moderate multilevel degenerative changes predominantly consisting osteophytosis and endplate sclerosis/cystic change. These findings are most severe at L5-S1, where there is prominent inferior endplate sclerosis.   Spinal Canal/Neural Foramina: There is mild spinal canal narrowing at L4-L5 secondary to posterior disc osteophyte complex, ligamentous redundancy, and facet arthropathy. Mild neural foraminal stenosis at of the right L4-L5 and left-greater-than-right L5-S1 neural foramina secondary to facet hypertrophy and foraminal osteophytosis.   Paraspinal Soft Tissues: Within normal limits.   Other: No significant abnormality of the visualized abdomen and pelvis.       1. No acute osseous abnormality. 2. Moderate to severe multilevel degenerative change, as described above. 3. Mild neural foraminal stenosis of the right L4-L5 and left-greater-than-right L5-S1 neural foramina.   Signed by: Pee Avila 4/20/2024 1:14 AM Dictation workstation:   [id number]        ASSESSMENT & PLAN     L knee medial meniscal tear  Large L knee effusion  Ambulatory dysfunction  -Concern for L meniscal injury vs exacerbation of DJD  -Follows with orthopedic surgery as outpatient; s/p steroid injection of L knee 3/14/24 by Dr. Chappell   -Cont pain control with scheduled Tylenol, Naprosyn (with PPI for GI ppx), PRN flexeril, BID Voltaren gel  -PT/OT evals - WBAT  -MRI concerning for meniscal tear and large joint effusion  -Consult orthopedics     Nicotine dependence  -Declined nicotine patch  -Encourage cessation     DVT Prophylaxis: SCDs    Code status: Full Code  Diet: Adult diet Regular     Disposition: continue to monitor inpatient, await consultant recommendations, and await clinical improvement    Level of MDM:  High   Risk: High   Data Reviewed and/or Analyzed:   Included: Prior external notes from at least 1 unique source, Results, including laboratory findings and imaging reports, listed above, and Notes  for this encounter.  I personally reviewed the tests referenced above.  I discussed plan of care with patient and nursing.    The patient/family had opportunity to ask questions. All questions were answered to the best of my ability.    Between 7AM-7PM please message me via Epic Secure Chat.  After 7PM please page Nocturnist on call.    Electronically signed by Claritza Casanova DO on 04/21/24 at 5:26 PM

## 2024-04-21 NOTE — CARE PLAN
The clinical goals for the shift include pt will remain safe and use call light    Problem: Pain  Goal: Takes deep breaths with improved pain control throughout the shift  Outcome: Progressing  Goal: Turns in bed with improved pain control throughout the shift  Outcome: Progressing  Goal: Walks with improved pain control throughout the shift  Outcome: Progressing  Goal: Performs ADL's with improved pain control throughout shift  Outcome: Progressing  Goal: Participates in PT with improved pain control throughout the shift  Outcome: Progressing  Goal: Free from opioid side effects throughout the shift  Outcome: Progressing  Goal: Free from acute confusion related to pain meds throughout the shift  Outcome: Progressing

## 2024-04-21 NOTE — CARE PLAN
The patient's goals for the shift include      The clinical goals for the shift include pt will remain safe during shift      Problem: Pain  Goal: Takes deep breaths with improved pain control throughout the shift  Outcome: Progressing  Goal: Turns in bed with improved pain control throughout the shift  Outcome: Progressing  Goal: Walks with improved pain control throughout the shift  Outcome: Progressing  Goal: Performs ADL's with improved pain control throughout shift  Outcome: Progressing  Goal: Participates in PT with improved pain control throughout the shift  Outcome: Progressing  Goal: Free from opioid side effects throughout the shift  Outcome: Progressing  Goal: Free from acute confusion related to pain meds throughout the shift  Outcome: Progressing

## 2024-04-22 VITALS
OXYGEN SATURATION: 96 % | RESPIRATION RATE: 18 BRPM | HEART RATE: 73 BPM | DIASTOLIC BLOOD PRESSURE: 87 MMHG | HEIGHT: 66 IN | TEMPERATURE: 96.8 F | BODY MASS INDEX: 38.57 KG/M2 | SYSTOLIC BLOOD PRESSURE: 124 MMHG | WEIGHT: 240 LBS

## 2024-04-22 PROCEDURE — 2500000001 HC RX 250 WO HCPCS SELF ADMINISTERED DRUGS (ALT 637 FOR MEDICARE OP): Mod: SE | Performed by: STUDENT IN AN ORGANIZED HEALTH CARE EDUCATION/TRAINING PROGRAM

## 2024-04-22 PROCEDURE — G0378 HOSPITAL OBSERVATION PER HR: HCPCS

## 2024-04-22 PROCEDURE — 99239 HOSP IP/OBS DSCHRG MGMT >30: CPT | Performed by: STUDENT IN AN ORGANIZED HEALTH CARE EDUCATION/TRAINING PROGRAM

## 2024-04-22 RX ORDER — NAPROXEN 375 MG/1
375 TABLET ORAL 2 TIMES DAILY PRN
Qty: 60 TABLET | Refills: 0 | Status: SHIPPED | OUTPATIENT
Start: 2024-04-22

## 2024-04-22 RX ORDER — PANTOPRAZOLE SODIUM 40 MG/1
40 TABLET, DELAYED RELEASE ORAL
Qty: 30 TABLET | Refills: 1 | Status: SHIPPED | OUTPATIENT
Start: 2024-04-22 | End: 2024-06-21

## 2024-04-22 RX ORDER — DICLOFENAC SODIUM 10 MG/G
4 GEL TOPICAL 2 TIMES DAILY
Qty: 240 G | Refills: 0 | Status: SHIPPED | OUTPATIENT
Start: 2024-04-22 | End: 2024-05-22

## 2024-04-22 RX ADMIN — ACETAMINOPHEN 975 MG: 325 TABLET ORAL at 10:31

## 2024-04-22 RX ADMIN — NAPROXEN 375 MG: 250 TABLET ORAL at 12:10

## 2024-04-22 RX ADMIN — NAPROXEN 375 MG: 250 TABLET ORAL at 08:25

## 2024-04-22 RX ADMIN — CYCLOBENZAPRINE HYDROCHLORIDE 5 MG: 10 TABLET, FILM COATED ORAL at 08:25

## 2024-04-22 ASSESSMENT — COGNITIVE AND FUNCTIONAL STATUS - GENERAL
DAILY ACTIVITIY SCORE: 15
DRESSING REGULAR LOWER BODY CLOTHING: A LOT
PERSONAL GROOMING: A LOT
WALKING IN HOSPITAL ROOM: A LOT
HELP NEEDED FOR BATHING: A LITTLE
MOBILITY SCORE: 20
DRESSING REGULAR UPPER BODY CLOTHING: A LOT
CLIMB 3 TO 5 STEPS WITH RAILING: A LOT
TOILETING: A LOT

## 2024-04-22 ASSESSMENT — PAIN SCALES - GENERAL: PAINLEVEL_OUTOF10: 7

## 2024-04-22 ASSESSMENT — PAIN - FUNCTIONAL ASSESSMENT: PAIN_FUNCTIONAL_ASSESSMENT: 0-10

## 2024-04-22 NOTE — PROGRESS NOTES
I met with Scar at the bedside regarding discharge planning and home going needs. Patient lives at the [facility] where she is usually independent without assistive devices. Patient is recommended for low intensity therapy, I did call supervisor Sharon [phone] to ask her would PT/OT be able to come inside of the center to do therapy with this patient. She says that it is a sober house and that they are really strict about outsider's coming into their facility and that it would be better for patient to seek therapy as an outpatient. I will continue to follow with a safe discharge plan.

## 2024-04-22 NOTE — HOSPITAL COURSE
This is a 54 y.o. female with past medical history of OA of the bilateral knees and carpal tunnel syndrome, polysubstance use disorder in remission (etOH, crack cocaine), tobacco use who presented to the emergency department with chief complaint of L knee pain and inability to ambulate. Admitted for further pain control. Did well on naproxen and cyclobenzaprine. MRI showed meniscal tear and large effusion in the knee. This was discussed with the ortho team who recommended no acute intervention and OP follow up with orthopedic surgery. Pt seen by PT/OT who rec home PT/OT vs OP. Unfortunately pt's facility does not allow outside individuals to come in.

## 2024-04-22 NOTE — DISCHARGE SUMMARY
Discharge Diagnosis  Ambulatory dysfunction    Issues Requiring Follow-Up  Knee needs ortho follow up    Test Results Pending At Discharge  Pending Labs       No current pending labs.            Hospital Course  This is a 54 y.o. female with past medical history of OA of the bilateral knees and carpal tunnel syndrome, polysubstance use disorder in remission (etOH, crack cocaine), tobacco use who presented to the emergency department with chief complaint of L knee pain and inability to ambulate. Admitted for further pain control. Did well on naproxen and cyclobenzaprine. MRI showed meniscal tear and large effusion in the knee. This was discussed with the ortho team who recommended no acute intervention and OP follow up with orthopedic surgery. Pt seen by PT/OT who rec home PT/OT vs OP. Unfortunately pt's facility does not allow outside individuals to come in.         Pertinent Physical Exam At Time of Discharge  Physical Exam  Vitals and nursing note reviewed.   Constitutional:       General: She is not in acute distress.     Appearance: Normal appearance. She is not ill-appearing.   HENT:      Head: Normocephalic and atraumatic.   Pulmonary:      Effort: No respiratory distress.   Abdominal:      Tenderness: There is no guarding.   Musculoskeletal:      Left lower leg: Edema (at the knee non tender) present.   Skin:     Coloration: Skin is not jaundiced or pale.   Neurological:      Mental Status: She is alert and oriented to person, place, and time.   Psychiatric:         Mood and Affect: Mood normal.         Home Medications     Medication List      START taking these medications     diclofenac sodium 1 % gel; Commonly known as: Voltaren; Apply 4.5 inches   (4 g) topically 2 times a day.   naproxen 375 mg tablet; Commonly known as: Naprosyn; Take 1 tablet (375   mg) by mouth 2 times a day as needed for mild pain (1 - 3) or moderate   pain (4 - 6) (pain. take WITH FOOD).     CONTINUE taking these medications      cyclobenzaprine 10 mg tablet; Commonly known as: Flexeril; Take 1 tablet   (10 mg) by mouth 3 times a day as needed for muscle spasms for up to 7   days.   Flovent  mcg/actuation inhaler; Generic drug: fluticasone   melatonin 10 mg tablet   pantoprazole 40 mg EC tablet; Commonly known as: ProtoNix; Take 1 tablet   (40 mg) by mouth once daily in the morning. Take before meals. Do not   crush, chew, or split.   risperiDONE 1 mg tablet; Commonly known as: RisperDAL   Ventolin HFA 90 mcg/actuation inhaler; Generic drug: albuterol   Vitamin D3 5,000 Units tablet; Generic drug: cholecalciferol     STOP taking these medications     ibuprofen 600 mg tablet   ketorolac 10 mg tablet; Commonly known as: Toradol       Outpatient Follow-Up  Future Appointments   Date Time Provider Department Center   4/30/2024  1:30 PM [facility]   4/30/2024  2:20 PM Ashok Hernandez MD [id number] [facility]   6/21/2024  9:00 AM Mushtaq Purdy MD [id number] [facility]       Reginaldo Srivastava DO

## 2024-04-22 NOTE — SIGNIFICANT EVENT
HOSPITAL MEDICINE    PLAN OF CARE    Discussed with ortho team. No IP plan for effusion drainage or intervention for meniscal tear. Recommended conservative mgmg and OP follow up.         Reginaldo Srivastava DO    Hospital Medicine    Pager: Epic Chat

## 2024-04-30 ENCOUNTER — HOSPITAL ENCOUNTER (OUTPATIENT)
Dept: RADIOLOGY | Facility: HOSPITAL | Age: 54
Discharge: HOME | End: 2024-04-30
Payer: COMMERCIAL

## 2024-04-30 ENCOUNTER — APPOINTMENT (OUTPATIENT)
Dept: RADIOLOGY | Facility: HOSPITAL | Age: 54
End: 2024-04-30
Payer: COMMERCIAL

## 2024-04-30 ENCOUNTER — OFFICE VISIT (OUTPATIENT)
Dept: ORTHOPEDIC SURGERY | Facility: HOSPITAL | Age: 54
End: 2024-04-30
Payer: COMMERCIAL

## 2024-04-30 DIAGNOSIS — M75.81 RIGHT ROTATOR CUFF TENDONITIS: Primary | ICD-10-CM

## 2024-04-30 DIAGNOSIS — M25.511 BILATERAL SHOULDER PAIN, UNSPECIFIED CHRONICITY: ICD-10-CM

## 2024-04-30 DIAGNOSIS — M25.512 BILATERAL SHOULDER PAIN, UNSPECIFIED CHRONICITY: ICD-10-CM

## 2024-04-30 DIAGNOSIS — M75.82 ROTATOR CUFF TENDONITIS, LEFT: ICD-10-CM

## 2024-04-30 PROCEDURE — 2500000005 HC RX 250 GENERAL PHARMACY W/O HCPCS: Performed by: FAMILY MEDICINE

## 2024-04-30 PROCEDURE — 99204 OFFICE O/P NEW MOD 45 MIN: CPT | Performed by: FAMILY MEDICINE

## 2024-04-30 PROCEDURE — 2500000004 HC RX 250 GENERAL PHARMACY W/ HCPCS (ALT 636 FOR OP/ED): Performed by: FAMILY MEDICINE

## 2024-04-30 PROCEDURE — 73030 X-RAY EXAM OF SHOULDER: CPT | Mod: 50

## 2024-04-30 PROCEDURE — 99214 OFFICE O/P EST MOD 30 MIN: CPT | Mod: 25 | Performed by: FAMILY MEDICINE

## 2024-04-30 PROCEDURE — 20610 DRAIN/INJ JOINT/BURSA W/O US: CPT | Mod: 50 | Performed by: FAMILY MEDICINE

## 2024-04-30 PROCEDURE — 73030 X-RAY EXAM OF SHOULDER: CPT | Mod: BILATERAL PROCEDURE | Performed by: RADIOLOGY

## 2024-04-30 RX ADMIN — ROPIVACAINE HYDROCHLORIDE 4 ML: 5 INJECTION EPIDURAL; INFILTRATION; PERINEURAL at 15:04

## 2024-04-30 RX ADMIN — METHYLPREDNISOLONE ACETATE 80 MG: 40 INJECTION, SUSPENSION INTRA-ARTICULAR; INTRALESIONAL; INTRAMUSCULAR; INTRASYNOVIAL; SOFT TISSUE at 15:04

## 2024-04-30 RX ADMIN — LIDOCAINE HYDROCHLORIDE 4 ML: 10 INJECTION, SOLUTION INFILTRATION; PERINEURAL at 15:04

## 2024-04-30 NOTE — PROGRESS NOTES
Patient ID: Scar Prince is a 54 y.o. female.    L Inj/Asp: bilateral subacromial bursa on 4/30/2024 3:04 PM  Indications: pain  Details: 21 G needle, posterior approach  Medications (Right): 80 mg methylPREDNISolone acetate 40 mg/mL; 4 mL lidocaine 10 mg/mL (1 %); 4 mL ropivacaine 5 mg/ml (0.5 %)  Medications (Left): 80 mg methylPREDNISolone acetate 40 mg/mL; 4 mL lidocaine 10 mg/mL (1 %); 4 mL ropivacaine 5 mg/ml (0.5 %)  Outcome: tolerated well, no immediate complications  Procedure, treatment alternatives, risks and benefits explained, specific risks discussed. Consent was given by the patient. Immediately prior to procedure a time out was called to verify the correct patient, procedure, equipment, support staff and site/side marked as required. Patient was prepped and draped in the usual sterile fashion.       Sports Medicine Office Note    Today's Date:  04/30/2024     HPI: Scar Prince is a right-hand-dominant 54 y.o. female in [facility] house who presents today for bilateral shoulder pain.    Today, 4/30/2024, she presents the complaint of bilateral shoulder pain.  States that this has been intermittent for several years, but is getting progressively worse.  Has severe pain with any overhead activities.  She is currently 5 months sober.  Does not remember any specific or trauma, but states that they are large lapses in her memory due to her drug use.  She has been taking Tylenol and naproxen with minimal improvement of her pain.  Right=left in regards to pain.  Since she has been sober, she has been seen multiple providers for multiple different orthopedic complaints, including visits with orthopedic spine surgery and hand surgery.  She has been complaining of bilateral elbow pain, bilateral carpal tunnel syndrome, and low back pain with radiculopathy.  Also notes that she has some neck pain.    She has no other complaints.    Physical Examination:     The right shoulder is without obvious signs of  acute bony deformity, swelling, erythema or ecchymosis. There is no tenderness along the joint line. There is no tenderness at the AC joint. Active range of motion is greatly diminished and painful, but symmetrical. Passive range of motion is full, pain-free and symmetrical. Impingement signs are positive with Givens and crossover. Rotator cuff strength is decreased and painful.     The left shoulder is without obvious signs of acute bony deformity, swelling, erythema or ecchymosis. There is no tenderness along the joint line. There is no tenderness at the AC joint. Active range of motion is greatly diminished and painful, but symmetrical. Passive range of motion is full, pain-free and symmetrical. Impingement signs are positive with Givens and crossover. Rotator cuff strength is decreased and painful.     Imaging:  Radiographs of the bilateral shoulders obtained today were reviewed and revealed minimal glenohumeral and mild AC joint degenerative change.  The studies were reviewed with Dr. Hernandez personally in the office today.      Procedure:  After consent was obtained, the RIGHT posterior shoulder was prepped in sterile fashion. The area was aspirated and Depo-Medrol 80 mg with lidocaine 4 mL & ropivacaine 4 mL were injected into the subacromial space without complications. The patient tolerated the procedure well and the area was cleaned and bandaged.    Procedure Note Attestation   Procedure performed by Dr. Ashok Hernandez MD    After consent was obtained, the LEFT posterior shoulder was prepped in sterile fashion. The area was aspirated and Depo-Medrol 80 mg with lidocaine 4 mL & ropivacaine 4 mL were injected into the subacromial space without complications. The patient tolerated the procedure well and the area was cleaned and bandaged.    Procedure Note Attestation   Procedure performed by my sports medicine fellow.    I, Dr. Ashok Hernandez MD, supervised the entire procedure .    Problem List Items Addressed  This Visit             ICD-10-CM    Right rotator cuff tendonitis - Primary M75.81    Relevant Orders    L Inj/Asp: bilateral subacromial bursa    Referral to Physical Therapy    Rotator cuff tendonitis, left M75.82    Relevant Orders    L Inj/Asp: bilateral subacromial bursa    Referral to Physical Therapy     Other Visit Diagnoses         Codes    Bilateral shoulder pain, unspecified chronicity     M25.511, M25.512    Relevant Orders    XR shoulder 2+ views bilateral            Assessment and Plan:     We reviewed the exam and x-ray findings and discussed the conservative and surgical treatment options. We agreed that she does have bilateral rotator cuff tendinitis.  However, with her complaint of bilateral shoulder, elbow and bilateral wrist pain, highly concerning for cervical radiculopathy or other rheumatologic disease.  Recommend she follow-up with rheumatology to discuss this further.  We agreed to bilateral subacromial corticosteroid injections today.  She tolerated these well.  Activity modifications were reviewed.  If she does not get good relief from bilateral subacromial corticosteroid injections within 4 weeks, recommend following up with Katey Collazo.    **This note was dictated using Dragon speech recognition software and was not corrected for spelling or grammatical errors**.

## 2024-05-02 RX ORDER — METHYLPREDNISOLONE ACETATE 40 MG/ML
80 INJECTION, SUSPENSION INTRA-ARTICULAR; INTRALESIONAL; INTRAMUSCULAR; SOFT TISSUE
Status: COMPLETED | OUTPATIENT
Start: 2024-04-30 | End: 2024-04-30

## 2024-05-02 RX ORDER — LIDOCAINE HYDROCHLORIDE 10 MG/ML
4 INJECTION INFILTRATION; PERINEURAL
Status: COMPLETED | OUTPATIENT
Start: 2024-04-30 | End: 2024-04-30

## 2024-05-02 RX ORDER — ROPIVACAINE HYDROCHLORIDE 5 MG/ML
4 INJECTION, SOLUTION EPIDURAL; INFILTRATION; PERINEURAL
Status: COMPLETED | OUTPATIENT
Start: 2024-04-30 | End: 2024-04-30

## 2024-05-06 ENCOUNTER — APPOINTMENT (OUTPATIENT)
Dept: ORTHOPEDIC SURGERY | Facility: CLINIC | Age: 54
End: 2024-05-06
Payer: COMMERCIAL

## 2024-05-10 ENCOUNTER — APPOINTMENT (OUTPATIENT)
Dept: ORTHOPEDIC SURGERY | Facility: CLINIC | Age: 54
End: 2024-05-10
Payer: COMMERCIAL

## 2024-05-16 ENCOUNTER — EVALUATION (OUTPATIENT)
Dept: OCCUPATIONAL THERAPY | Facility: HOSPITAL | Age: 54
End: 2024-05-16
Payer: COMMERCIAL

## 2024-05-16 ENCOUNTER — EVALUATION (OUTPATIENT)
Dept: PHYSICAL THERAPY | Facility: HOSPITAL | Age: 54
End: 2024-05-16
Payer: COMMERCIAL

## 2024-05-16 DIAGNOSIS — M25.512 CHRONIC PAIN OF BOTH SHOULDERS: ICD-10-CM

## 2024-05-16 DIAGNOSIS — M25.519 SHOULDER PAIN: Primary | ICD-10-CM

## 2024-05-16 DIAGNOSIS — S83.207A TEAR OF LEFT MENISCUS AS CURRENT INJURY, INITIAL ENCOUNTER: ICD-10-CM

## 2024-05-16 DIAGNOSIS — G89.29 CHRONIC PAIN OF BOTH SHOULDERS: ICD-10-CM

## 2024-05-16 DIAGNOSIS — M25.511 CHRONIC PAIN OF BOTH SHOULDERS: ICD-10-CM

## 2024-05-16 DIAGNOSIS — M25.462 KNEE EFFUSION, LEFT: ICD-10-CM

## 2024-05-16 PROCEDURE — 97110 THERAPEUTIC EXERCISES: CPT | Mod: GO

## 2024-05-16 PROCEDURE — 97110 THERAPEUTIC EXERCISES: CPT | Mod: GP | Performed by: PHYSICAL THERAPIST

## 2024-05-16 PROCEDURE — 97129 THER IVNTJ 1ST 15 MIN: CPT | Mod: GO

## 2024-05-16 PROCEDURE — 97161 PT EVAL LOW COMPLEX 20 MIN: CPT | Mod: GP | Performed by: PHYSICAL THERAPIST

## 2024-05-16 ASSESSMENT — ENCOUNTER SYMPTOMS
LOSS OF SENSATION IN FEET: 0
OCCASIONAL FEELINGS OF UNSTEADINESS: 0
DEPRESSION: 0

## 2024-05-16 NOTE — PROGRESS NOTES
Occupational Therapy Evaluation    Patient Name: Scar Prince  MRN: [id number]  Today's Date: 5/17/2024  Time Calculation  Start Time: 0150  Stop Time: 0230  Time Calculation (min): 40 min      Insurance  Visit 1 of  Authorization: required  Insurance plan:    Assessment: Tamara a 53 yo woman in recovery with bilateral shoulder pain, which she has had for years. She reports moderate pain and tingling as well as weakness, and demonstrates limited motion, all of which make I/ADL difficult. I got her started today on a home program that addresses these issues. She will benefit from ongoing occupational therapy to get her back to safe and pain-free I/ADL performance and her prior level of function.     Plan: therapeutic exercise, therapeutic activity, self-care, home management, manual therapy, neuromuscular coordination, vasopneumatic, electric stimulation, fluidotherapy, ultrasound, kinesiotaping, orthosis fabrication, wound/scar/edema care  Goals  In 8-10 weeks, Scar will achieve the following goals  She will be able to perform self-care, household, work, and leisure tasks with lower pain (1-3/10), 75% of the time.    She will improve bilateral shoulder AROM in order to fully perform self-care, household, work and leisure tasks:    Flexion/Abduction 120 deg, Internal/External Rotation T10/60 deg     She will regain 4+/5 to 5/5 bilateral shoulder strength in order to fully perform self-care, household, work, and leisure tasks     She will decrease her QuickDASH score by 8-10 points ( 18.18  at eval)     Patient's goals for therapy: no pain, full motion and use of both shoulders    Plan of care was developed with input and agreement by the patient  Frequency: 1 x Week  Duration: 8 Weeks    Subjective   Doing hair, washing  back, putting on bra on  In recovery, just noticing all of her pains now  Onset: years   Had cortisone shots in bilateral shoulders recently, which helped a lot, but are starting to wear  off    Pain  Nagging pain in bilateral hands all aspects, L shoulder hurts more than R currently; no number given       Objective   Outcome Measure: QuickDASH: 18.18    Sensation  Tingling daily in bilateral hands before the shots, none since the shots    Neuro exam:TBE  Radial nerve: 5/5 strength in thumb extension, sensation intact to dorsal  surface of thumb/index web space  Median nerve: 5/5 strength in thumb abduction and opposition, sensation  intact to palmar surface of index finger  Ulnar nerve: 5/5 strength in thumb/little finger approximation, sensation  intact to palmar surface of little finger     AROM  Shoulder: R/L  flexion 134/66  extension 62/59   abduction 127/50  external rotation 68/65  internal rotation t10/t11     ElbowWrist/Hand WNL    Strength TBE  Shoulder:  abduction /5  adduction /5  external rotation with shoulder at side /5  internal rotation with shoulder at side /5  flexion /5  extension /5    Elbow:  flexion /5  extension /5  supination /5  pronation /5    Wrist:  flexion /5  extension /5  radial deviation /5  ulnar deviation /5  pronation /5  supination /5    Hand:   strength (pos 2):     L , R   pinch strength: 3 point: L , R                             badillo:      L , R        Treatment  Education: home exercise program, plan of care, activity modification, pain management, and pertinent anatomy     Modalities:  Moist heat to bilateral shoulders to prepare for treatment  Therapeutic Exercise:  Instructed her in her home program: moist heat prior to active scapular motion (elevation, depression, retraction, combined downward rotation and retraction) and passive shoulder flexion, and internal/external rotation x 2 sets of 10 2-3x/day, which she demonstrated correctly.      OT Evaluation Time Entry  OT Evaluation (Low) Time Entry: 20  OT Therapeutic Procedures Time Entry  Therapeutic Exercise Time Entry: 20

## 2024-05-16 NOTE — PROGRESS NOTES
Initial evaluation  Physical Therapy Initial Evaluation    Patient Name:Scar Prince  MRN:[id number]  Today's Date:5/16/2024  Referred by: Reginaldo Srivastava  Time Calculation  Start Time: 1420  Stop Time: 1452  Time Calculation (min): 32 min    Therapy Diagnosis  1. Tear of left meniscus as current injury, initial encounter    2. Knee effusion, left         Plan of Care  Planned interventions include PRN: therapeutic exercise, manual therapy, gait training, electrical stimulation, hot pack, vasopneumatic device with cold, HEP training.   Frequency and duration: 1 time(s) a week, for  8 weeks or 8 visits .   Plan of care was developed with input and agreement by the patient.   Plan for next session: review HEP, continue stretching and regaining full ROM    Assessment  Problem List: Pain, range of motion/joint mobility, strength, activity limitations, ADLs/IADLs/self care skills, decreased functional level, decreased knowledge of HEP, edema, flexibility.    Patient is a 54 y.o. female who presents with complaint of L knee pain. Standardized testing and measures administered today reveal that the patient has multiple impairments in body structures and functions, activity limitations, and participation restrictions. These include subjective and objective findings such as pain, tenderness to palpation of the affected area, decreased ROM, strength, flexibility, and function. The patient's impairments are likely influenced by mechanical dysfunction and deconditioning with possible overuse and degenerative changes. Skilled PT services are warranted in order to realize measurable and meaningful change in the above outcome measures and achieve improvements in the patient's functional status and individual goals.    Rehab Potential:good  Clinical Presentation: Stable and/or uncomplicated characteristics.   Evaluation Complexity: Low    Precautions/Fall Risk:none* Pacemaker no  Seizures No  Post Op Movement/Restrictions  No    Insurance  Visit number: 1   Approved number of visits: req auth  Onset Date: 2024  Certification Period:  Beginnin2024            Ending: ??  Payor: LANCE / Plan: LANCE AGED BLIND AND DISABLED / Product Type: *No Product type* /     Subjective  Chief complaint/reason for visit: The patient is  53 y.o. female presenting for atraumatic onset L knee pain; however she is unable to recall if she may have injured it while under the influence. She is now 5 months sober.  She was recently seen for this pain in the ED on 2024, where she was diagnosed with mild to moderate osteoarthritis and referred to orthopedics for follow-up and management. She recounts no specific inciting event, but the pain has been getting worse over time. She had injection in her knee which has since resolved all over her complaints, prior to injection she was having pain with everything. She has had an MRI but is schedule for MRI with contract .  Mechanism of Injury:  unknown  Location of Pain: L knee globally  Current Pain Level (0-10): 0   High Pain Level (0-10): 10   Low Pain Level (0-10): 0  Pain Quality: aching, sharp, and throbbing  Pain Exacerbating Factors: movement, sitting, standing, walking, any activity (has been pain free for past 5 weeks since shot)  Pain Relieving Factors: medications nonsteroidal anti-inflammatory drugs and Tylenol and Toradol, cortisone injection    Medical Screening: Reviewed medical history form with patient and medical screening assessed.   Red Flags: Do you have any of the following? No  Fever/chills, unexplained weight changes, dizziness/fainting, unexplained change in bowel or bladder functions, unexplained malaise or muscle weakness, night pain/sweats, numbness or tingling  Current Medical Management: Medical, Injection, Imaging  Prior Level of Function (PLOF)  Patient previously independent with all ADLs  Exercise/Physical Activity: Normal Walking,  stairs  Functional limitations: decreased positional tolerances to standing, sitting, walking, bending knee, stairs, self-care activities, participation in home management/duties, participation in leisure activities and athletics.  Work Status: unemployed  Current Status: improved  Patient Awareness: Patient is aware of  her diagnosis and prognosis.   Living Environment:  recovery house   Social Support: chemical dependency / abuse concerns and AA and recovery home  Personal Factors That May Impact Care: history of substance abuse, transportation, obesity  Patient's Goal for Treatment: relieving pain, increasing strength, increasing mobility, improving positional tolerances, walking with a normal/pain free gait, reducing symptoms, returning to work,  and resuming athletics/activities .     Objective  Other Measures  Lower Extremity Funtional Score (LEFS): 20/80 (answered as if symptoms were before injection)     Knee Objective  Observation/Posture: suprapatellar joint effusion, non painful today  Palpation: TTP suprapatellar region, mild TTP medial/lateral joint line and patellar tendon  Gait: previously antalgic, lacking terminal knee extension  AROM (tested in supine):  R/L knee flexion AROM (degrees): WFL // 110 deg  R/L knee extension AROM (degrees): WFL// lacking 18 deg    MMT:   R/L knee flexion strength (MMT): 4+/5 with pain  R/L knee extension strength (MMT): 4+/5 with pain    R/L single leg stance time (seconds): able to achieve SLS unable to hold balance >5 seconds  Functional Squat: Poor with anterior knee  Step up/Stairs: able to reciprocally    Special Tests: (-) anterior drawer, (-) varus/vaglus stress test, Efren - complaints of pain no audible/reproducible popping    Treatment Performed Today: Initial evaluation and patient education regarding diagnosis, prognosis, contributing factors, comorbidities, importance and instruction of HEP, role of PT, activity modification, appropriate shoe  "wear.    Therapeutic Exercise 10 minutes  Education/Resources provided today: Home Program   [facility]: reviewed and performed with patient the following HEP exercises:  Seated HS s: 30\" x R/L  LAQ: R/L x10   Quad Set: 5\" hold x 10 on L   SLR: x10 on L     Response to Treatment: improved knowledge and understanding of condition, improved strength, improved flexibility, I    Goals: Goals set and discussed today.   Lower Extremity Goals  Lower Extremity Goals: By discharge, patient will:  1) Demonstrate independence with home exercise program for overall symptom management  2) Increase overall exercise tolerance without adverse reaction or increased chief complaint  3) Increase ROM of the  L knee to full knee extension in order to improve the ability to perform essential ADLs and normalize gait  4) Increase strength of the L knee to 5/5 in order to improve the ability to perform essential ADLs  5) Report decrease in pain by >= 2 points to meet MCID  6) Increase score of LEFS by > 9 points to meet the MCID    Patient stated goal: relieve pain in knees    "

## 2024-05-17 ASSESSMENT — ENCOUNTER SYMPTOMS
DEPRESSION: 0
LOSS OF SENSATION IN FEET: 0
OCCASIONAL FEELINGS OF UNSTEADINESS: 0

## 2024-05-22 ENCOUNTER — OFFICE VISIT (OUTPATIENT)
Dept: ORTHOPEDIC SURGERY | Facility: HOSPITAL | Age: 54
End: 2024-05-22
Payer: COMMERCIAL

## 2024-05-22 DIAGNOSIS — M25.512 BILATERAL SHOULDER PAIN, UNSPECIFIED CHRONICITY: Primary | ICD-10-CM

## 2024-05-22 DIAGNOSIS — M25.511 BILATERAL SHOULDER PAIN, UNSPECIFIED CHRONICITY: Primary | ICD-10-CM

## 2024-05-22 DIAGNOSIS — M25.522 PAIN OF BOTH ELBOWS: ICD-10-CM

## 2024-05-22 DIAGNOSIS — M25.521 PAIN OF BOTH ELBOWS: ICD-10-CM

## 2024-05-22 DIAGNOSIS — M79.641 BILATERAL HAND PAIN: ICD-10-CM

## 2024-05-22 DIAGNOSIS — M79.642 BILATERAL HAND PAIN: ICD-10-CM

## 2024-05-22 PROCEDURE — 99214 OFFICE O/P EST MOD 30 MIN: CPT | Performed by: FAMILY MEDICINE

## 2024-05-22 RX ORDER — INDOMETHACIN 75 MG/1
75 CAPSULE, EXTENDED RELEASE ORAL
Qty: 60 CAPSULE | Refills: 1 | Status: SHIPPED | OUTPATIENT
Start: 2024-05-22 | End: 2024-07-21

## 2024-05-22 ASSESSMENT — PAIN SCALES - GENERAL: PAINLEVEL_OUTOF10: 10 - WORST POSSIBLE PAIN

## 2024-05-22 ASSESSMENT — PAIN - FUNCTIONAL ASSESSMENT: PAIN_FUNCTIONAL_ASSESSMENT: 0-10

## 2024-05-22 NOTE — PROGRESS NOTES
Patient is here for bilateral shoulder pain hand pain can barely lift her arms     Sports Medicine Office Note    Today's Date:  05/22/2024     HPI: Scar Prince is a 54 y.o. RHD female in recovery house who presents today for follow-up bilateral shoulder, arm and hand pain.    On 4/30/2024, she presents the complaint of bilateral shoulder pain.  States that this has been intermittent for several years, but is getting progressively worse.  Has severe pain with any overhead activities.  She is currently 5 months sober.  Does not remember any specific or trauma, but states that they are large lapses in her memory due to her drug use.  She has been taking Tylenol and naproxen with minimal improvement of her pain.  Right=left in regards to pain.  Since she has been sober, she has been seen multiple providers for multiple different orthopedic complaints, including visits with orthopedic spine surgery and hand surgery.  She has been complaining of bilateral elbow pain, bilateral carpal tunnel syndrome, and low back pain with radiculopathy.  Also notes that she has some neck pain.  We agreed that she does have bilateral rotator cuff tendinitis.  However, with her complaint of bilateral shoulder, elbow and bilateral wrist pain, highly concerning for cervical radiculopathy or other rheumatologic disease.  Recommend she follow-up with rheumatology to discuss this further.  We agreed to bilateral subacromial corticosteroid injections today.  She tolerated these well.  Activity modifications were reviewed.  If she does not get good relief from bilateral subacromial corticosteroid injections within 4 weeks, recommend following up with Katey Collazo.    Today, 5/22/2024, she returns for 3-week follow-up of bilateral arm and shoulder pain.  She states the subacromial cortisone injections helped relieve shoulder, arm, hand and wrist pain and lower extremity pain for 2 to 3 days after the injection and then wore off completely.   She has been taking Tylenol and ibuprofen 600 mg daily.  She denies interval injury or trauma.  She has trouble lifting her arms due to pain.  Her worst pain today is at her hands.  She has not been able to go to physical therapy.  She denies interval injury or trauma.    She has no other complaints.    Physical Examination:   Exam is unchanged from last visit.  The right shoulder is without obvious signs of acute bony deformity, swelling, erythema or ecchymosis. There is no tenderness along the joint line. There is no tenderness at the AC joint. Active range of motion is greatly diminished and painful, but symmetrical. Passive range of motion is full, pain-free and symmetrical. Impingement signs are positive with Givens and crossover. Rotator cuff strength is decreased and painful.     The left shoulder is without obvious signs of acute bony deformity, swelling, erythema or ecchymosis. There is no tenderness along the joint line. There is no tenderness at the AC joint. Active range of motion is greatly diminished and painful, but symmetrical. Passive range of motion is full, pain-free and symmetrical. Impingement signs are positive with Givens and crossover. Rotator cuff strength is decreased and painful.     Imaging:  === 04/30/24 ===  XR SHOULDER 2+ VIEWS BILATERAL  - Impression -  Mild degenerative changes of the acromioclavicular joints bilaterally  with small soft tissue calcification on the right near the greater  tuberosity consistent with calcific bursitis/tendinitis.  Signed by: Charlene Xie 5/3/2024 12:59 PM      Problem List Items Addressed This Visit    None  Visit Diagnoses         Codes    Bilateral shoulder pain, unspecified chronicity    -  Primary M25.511, M25.512    Relevant Medications    indomethacin SR (Indocin SR) 75 mg ER capsule    Other Relevant Orders    ISMAEL with Reflex to EVARISTO    CBC and Auto Differential    C-Reactive Protein    Sedimentation Rate    Rheumatoid Factor    Bilateral hand  pain     M79.641, M79.642    Relevant Medications    indomethacin SR (Indocin SR) 75 mg ER capsule    Other Relevant Orders    ISMAEL with Reflex to EVARISTO    CBC and Auto Differential    C-Reactive Protein    Sedimentation Rate    Rheumatoid Factor    Pain of both elbows     M25.521, M25.522    Relevant Medications    indomethacin SR (Indocin SR) 75 mg ER capsule    Other Relevant Orders    ISMAEL with Reflex to EVARISTO    CBC and Auto Differential    C-Reactive Protein    Sedimentation Rate    Rheumatoid Factor            Assessment and Plan:     We reviewed the exam and x-ray findings and discussed the conservative and surgical treatment options. We agreed that the subacromial cortisone injection gave her complete pain relief down the entire arm and even into the lower extremities for 2 to 3 days before wear off.  Therefore this is less likely a rotator cuff issue and more likely rheumatological issue.  I recommend that she follow-up with her PCP and pursue rheumatology evaluation.  We agreed to obtain lab work for rheumatological screening test and she will follow-up with her PCP for results.  We agreed to start her on prescription indomethacin.  I do not feel there is anything else I can add to her treatment plan at this time.  She will follow-up if she has an additional complaint.    **This note was dictated using Dragon speech recognition software and was not corrected for spelling or grammatical errors**.    Ashok Hernandez MD  Sports Medicine Specialist  [facility] [place]

## 2024-05-23 ENCOUNTER — APPOINTMENT (OUTPATIENT)
Dept: OCCUPATIONAL THERAPY | Facility: HOSPITAL | Age: 54
End: 2024-05-23
Payer: COMMERCIAL

## 2024-05-25 ENCOUNTER — APPOINTMENT (OUTPATIENT)
Dept: RADIOLOGY | Facility: HOSPITAL | Age: 54
End: 2024-05-25
Payer: COMMERCIAL

## 2024-05-30 ENCOUNTER — APPOINTMENT (OUTPATIENT)
Dept: OCCUPATIONAL THERAPY | Facility: HOSPITAL | Age: 54
End: 2024-05-30
Payer: COMMERCIAL

## 2024-06-01 ENCOUNTER — HOSPITAL ENCOUNTER (OUTPATIENT)
Dept: RADIOLOGY | Facility: HOSPITAL | Age: 54
Discharge: HOME | End: 2024-06-01
Payer: COMMERCIAL

## 2024-06-01 DIAGNOSIS — G89.29 CHRONIC PAIN OF LEFT KNEE: ICD-10-CM

## 2024-06-01 DIAGNOSIS — M25.562 CHRONIC PAIN OF LEFT KNEE: ICD-10-CM

## 2024-06-01 DIAGNOSIS — M23.92 ACUTE INTERNAL DERANGEMENT OF LEFT KNEE: ICD-10-CM

## 2024-06-01 PROCEDURE — 73721 MRI JNT OF LWR EXTRE W/O DYE: CPT | Mod: LT

## 2024-06-01 PROCEDURE — 73721 MRI JNT OF LWR EXTRE W/O DYE: CPT | Mod: LEFT SIDE | Performed by: RADIOLOGY

## 2024-06-11 ENCOUNTER — APPOINTMENT (OUTPATIENT)
Dept: PHYSICAL THERAPY | Facility: HOSPITAL | Age: 54
End: 2024-06-11
Payer: COMMERCIAL

## 2024-06-13 ENCOUNTER — APPOINTMENT (OUTPATIENT)
Dept: PHYSICAL THERAPY | Facility: HOSPITAL | Age: 54
End: 2024-06-13
Payer: COMMERCIAL

## 2024-07-17 DIAGNOSIS — M25.521 PAIN OF BOTH ELBOWS: ICD-10-CM

## 2024-07-17 DIAGNOSIS — M79.642 BILATERAL HAND PAIN: ICD-10-CM

## 2024-07-17 DIAGNOSIS — M25.512 BILATERAL SHOULDER PAIN, UNSPECIFIED CHRONICITY: ICD-10-CM

## 2024-07-17 DIAGNOSIS — M79.641 BILATERAL HAND PAIN: ICD-10-CM

## 2024-07-17 DIAGNOSIS — M25.522 PAIN OF BOTH ELBOWS: ICD-10-CM

## 2024-07-17 DIAGNOSIS — M25.511 BILATERAL SHOULDER PAIN, UNSPECIFIED CHRONICITY: ICD-10-CM

## 2024-07-17 RX ORDER — INDOMETHACIN 75 MG/1
CAPSULE, EXTENDED RELEASE ORAL
Qty: 60 CAPSULE | Refills: 1 | Status: SHIPPED | OUTPATIENT
Start: 2024-07-17

## 2024-07-26 ENCOUNTER — DOCUMENTATION (OUTPATIENT)
Dept: PHYSICAL THERAPY | Facility: HOSPITAL | Age: 54
End: 2024-07-26
Payer: COMMERCIAL

## 2024-07-26 NOTE — PROGRESS NOTES
Physical Therapy    Discharge Summary    Name: Scar Prince  MRN: [id number]  : 1970  Date: 24    Discharge Summary: PT    Discharge Information: Date of discharge 24, Date of last visit 24, Date of evaluation 24, Number of attended visits 1, Referred by Dr. Hernnadez, and Referred for L knee pain    Discharge Status: Discharged     Rehab Discharge Reason: Attendance inconsistent and Failed to schedule and/or keep follow-up appointment(s)

## 2024-07-29 ENCOUNTER — APPOINTMENT (OUTPATIENT)
Dept: RHEUMATOLOGY | Facility: CLINIC | Age: 54
End: 2024-07-29
Payer: COMMERCIAL

## 2024-08-28 ENCOUNTER — DOCUMENTATION (OUTPATIENT)
Dept: OCCUPATIONAL THERAPY | Facility: HOSPITAL | Age: 54
End: 2024-08-28
Payer: COMMERCIAL

## 2024-08-28 NOTE — PROGRESS NOTES
Discharge Summary    Name: Scar Prince  MRN: [id number]  : 1970  Date: 24    Discharge Summary: OT    Discharge Information: Date of evaluation 516, Number of attended visits 1, Referred by Dr. Hernandez, and Referred for chronic shoulder pain    Therapy Summary: wound/scar/edema management, pain, weakness, paresthesia, splinting, HEP, functional hand use      Discharge Status: unknown     Rehab Discharge Reason: Failed to schedule and/or keep follow-up appointment(s)

## 2024-09-11 DIAGNOSIS — M25.511 BILATERAL SHOULDER PAIN, UNSPECIFIED CHRONICITY: ICD-10-CM

## 2024-09-11 DIAGNOSIS — M79.641 BILATERAL HAND PAIN: ICD-10-CM

## 2024-09-11 DIAGNOSIS — M25.522 PAIN OF BOTH ELBOWS: ICD-10-CM

## 2024-09-11 DIAGNOSIS — M79.642 BILATERAL HAND PAIN: ICD-10-CM

## 2024-09-11 DIAGNOSIS — M25.512 BILATERAL SHOULDER PAIN, UNSPECIFIED CHRONICITY: ICD-10-CM

## 2024-09-11 DIAGNOSIS — M25.521 PAIN OF BOTH ELBOWS: ICD-10-CM

## 2024-09-11 RX ORDER — INDOMETHACIN 75 MG/1
CAPSULE, EXTENDED RELEASE ORAL
Qty: 60 CAPSULE | Refills: 1 | Status: SHIPPED | OUTPATIENT
Start: 2024-09-11

## 2025-02-05 ENCOUNTER — HOSPITAL ENCOUNTER (EMERGENCY)
Facility: HOSPITAL | Age: 55
Discharge: HOME | End: 2025-02-05
Payer: COMMERCIAL

## 2025-02-05 ENCOUNTER — APPOINTMENT (OUTPATIENT)
Dept: RADIOLOGY | Facility: HOSPITAL | Age: 55
End: 2025-02-05
Payer: COMMERCIAL

## 2025-02-05 VITALS
WEIGHT: 250 LBS | RESPIRATION RATE: 20 BRPM | OXYGEN SATURATION: 100 % | SYSTOLIC BLOOD PRESSURE: 101 MMHG | HEART RATE: 80 BPM | HEIGHT: 68 IN | TEMPERATURE: 98.4 F | BODY MASS INDEX: 37.89 KG/M2 | DIASTOLIC BLOOD PRESSURE: 64 MMHG

## 2025-02-05 DIAGNOSIS — S92.302A CLOSED AVULSION FRACTURE OF METATARSAL BONE OF LEFT FOOT, INITIAL ENCOUNTER: Primary | ICD-10-CM

## 2025-02-05 PROCEDURE — 73610 X-RAY EXAM OF ANKLE: CPT | Mod: 50

## 2025-02-05 PROCEDURE — 73590 X-RAY EXAM OF LOWER LEG: CPT | Mod: LT

## 2025-02-05 PROCEDURE — 73630 X-RAY EXAM OF FOOT: CPT | Mod: LT

## 2025-02-05 PROCEDURE — 29515 APPLICATION SHORT LEG SPLINT: CPT | Performed by: PHYSICIAN ASSISTANT

## 2025-02-05 PROCEDURE — 73630 X-RAY EXAM OF FOOT: CPT | Mod: LEFT SIDE | Performed by: RADIOLOGY

## 2025-02-05 PROCEDURE — 2500000001 HC RX 250 WO HCPCS SELF ADMINISTERED DRUGS (ALT 637 FOR MEDICARE OP)

## 2025-02-05 PROCEDURE — 73590 X-RAY EXAM OF LOWER LEG: CPT | Mod: LEFT SIDE | Performed by: RADIOLOGY

## 2025-02-05 PROCEDURE — 99284 EMERGENCY DEPT VISIT MOD MDM: CPT | Performed by: PHYSICIAN ASSISTANT

## 2025-02-05 PROCEDURE — 2500000001 HC RX 250 WO HCPCS SELF ADMINISTERED DRUGS (ALT 637 FOR MEDICARE OP): Performed by: PHYSICIAN ASSISTANT

## 2025-02-05 PROCEDURE — 29515 APPLICATION SHORT LEG SPLINT: CPT | Mod: LT

## 2025-02-05 PROCEDURE — 73560 X-RAY EXAM OF KNEE 1 OR 2: CPT | Mod: LT

## 2025-02-05 PROCEDURE — 73610 X-RAY EXAM OF ANKLE: CPT | Mod: BILATERAL PROCEDURE | Performed by: RADIOLOGY

## 2025-02-05 PROCEDURE — 99284 EMERGENCY DEPT VISIT MOD MDM: CPT | Mod: 25

## 2025-02-05 PROCEDURE — 73560 X-RAY EXAM OF KNEE 1 OR 2: CPT | Mod: LEFT SIDE | Performed by: RADIOLOGY

## 2025-02-05 RX ORDER — IBUPROFEN 600 MG/1
600 TABLET ORAL ONCE
Status: DISCONTINUED | OUTPATIENT
Start: 2025-02-05 | End: 2025-02-05 | Stop reason: HOSPADM

## 2025-02-05 RX ORDER — ACETAMINOPHEN 325 MG/1
975 TABLET ORAL ONCE
Status: COMPLETED | OUTPATIENT
Start: 2025-02-05 | End: 2025-02-05

## 2025-02-05 RX ORDER — IBUPROFEN 600 MG/1
TABLET ORAL
Status: COMPLETED
Start: 2025-02-05 | End: 2025-02-05

## 2025-02-05 RX ORDER — ACETAMINOPHEN 325 MG/1
TABLET ORAL
Status: COMPLETED
Start: 2025-02-05 | End: 2025-02-05

## 2025-02-05 RX ORDER — CYCLOBENZAPRINE HCL 10 MG
10 TABLET ORAL 3 TIMES DAILY PRN
Qty: 21 TABLET | Refills: 0 | Status: SHIPPED | OUTPATIENT
Start: 2025-02-05 | End: 2025-02-12

## 2025-02-05 RX ORDER — CYCLOBENZAPRINE HCL 10 MG
10 TABLET ORAL ONCE
Status: COMPLETED | OUTPATIENT
Start: 2025-02-05 | End: 2025-02-05

## 2025-02-05 RX ORDER — ACETAMINOPHEN 500 MG
1000 TABLET ORAL EVERY 8 HOURS PRN
Qty: 30 TABLET | Refills: 0 | Status: SHIPPED | OUTPATIENT
Start: 2025-02-05 | End: 2025-02-15

## 2025-02-05 RX ADMIN — ACETAMINOPHEN 975 MG: 325 TABLET ORAL at 08:14

## 2025-02-05 RX ADMIN — CYCLOBENZAPRINE 10 MG: 10 TABLET, FILM COATED ORAL at 08:14

## 2025-02-05 ASSESSMENT — LIFESTYLE VARIABLES
HAVE PEOPLE ANNOYED YOU BY CRITICIZING YOUR DRINKING: NO
EVER FELT BAD OR GUILTY ABOUT YOUR DRINKING: NO
TOTAL SCORE: 0
HAVE YOU EVER FELT YOU SHOULD CUT DOWN ON YOUR DRINKING: NO
EVER HAD A DRINK FIRST THING IN THE MORNING TO STEADY YOUR NERVES TO GET RID OF A HANGOVER: NO

## 2025-02-05 ASSESSMENT — PAIN DESCRIPTION - ORIENTATION: ORIENTATION: RIGHT;LEFT

## 2025-02-05 ASSESSMENT — PAIN SCALES - GENERAL
PAINLEVEL_OUTOF10: 8
PAINLEVEL_OUTOF10: 4

## 2025-02-05 ASSESSMENT — COLUMBIA-SUICIDE SEVERITY RATING SCALE - C-SSRS
1. IN THE PAST MONTH, HAVE YOU WISHED YOU WERE DEAD OR WISHED YOU COULD GO TO SLEEP AND NOT WAKE UP?: NO
6. HAVE YOU EVER DONE ANYTHING, STARTED TO DO ANYTHING, OR PREPARED TO DO ANYTHING TO END YOUR LIFE?: NO
2. HAVE YOU ACTUALLY HAD ANY THOUGHTS OF KILLING YOURSELF?: NO

## 2025-02-05 ASSESSMENT — PAIN DESCRIPTION - LOCATION: LOCATION: ANKLE

## 2025-02-05 ASSESSMENT — PAIN - FUNCTIONAL ASSESSMENT: PAIN_FUNCTIONAL_ASSESSMENT: 0-10

## 2025-02-05 NOTE — Clinical Note
Scar Prince was seen and treated in our emergency department on 2/5/2025.  She may return to work on 02/12/2025.       If you have any questions or concerns, please don't hesitate to call.      Annie Batres PA-C

## 2025-02-05 NOTE — DISCHARGE INSTRUCTIONS
Keep splint clean and dry. Please follow up with orthopedics for definitive care of your foot fracture.     Please call [phone] for Primary Care referral for follow up appointments.

## 2025-02-05 NOTE — Clinical Note
cSar Prince was seen and treated in our emergency department on 2/5/2025.  She may return to work on 02/12/2025.       If you have any questions or concerns, please don't hesitate to call.      Annie Batres PA-C

## 2025-02-05 NOTE — ED PROCEDURE NOTE
Procedure  Splint Application    Performed by: Annie Batres PA-C  Authorized by: Annie Batres PA-C    Consent:     Consent obtained:  Verbal    Consent given by:  Patient    Risks, benefits, and alternatives were discussed: yes      Risks discussed:  Discoloration, numbness, swelling and pain    Alternatives discussed:  Referral  Universal protocol:     Imaging studies available: yes      Patient identity confirmed:  Verbally with patient  Pre-procedure details:     Distal neurologic exam:  Normal    Distal perfusion: distal pulses strong and brisk capillary refill    Procedure details:     Location:  Foot    Foot location:  L foot    Strapping: no      Cast type:  Short leg    Splint type:  Short leg    Attestation: Splint applied and adjusted personally by me    Post-procedure details:     Distal neurologic exam:  Normal    Distal perfusion: distal pulses strong and brisk capillary refill      Procedure completion:  Tolerated    Post-procedure imaging: not applicable                 Annie Batres PA-C  02/05/25 1128
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
## 2025-02-11 ENCOUNTER — APPOINTMENT (OUTPATIENT)
Dept: ORTHOPEDIC SURGERY | Facility: CLINIC | Age: 55
End: 2025-02-11
Payer: COMMERCIAL

## 2025-02-27 ENCOUNTER — APPOINTMENT (OUTPATIENT)
Dept: ORTHOPEDIC SURGERY | Facility: HOSPITAL | Age: 55
End: 2025-02-27
Payer: COMMERCIAL

## 2025-02-27 ENCOUNTER — APPOINTMENT (OUTPATIENT)
Dept: RADIOLOGY | Facility: HOSPITAL | Age: 55
End: 2025-02-27
Payer: COMMERCIAL

## 2025-02-27 DIAGNOSIS — M79.672 FOOT PAIN, LEFT: ICD-10-CM

## 2025-03-04 ENCOUNTER — APPOINTMENT (OUTPATIENT)
Dept: ORTHOPEDIC SURGERY | Facility: CLINIC | Age: 55
End: 2025-03-04
Payer: COMMERCIAL

## 2025-03-14 ENCOUNTER — APPOINTMENT (OUTPATIENT)
Dept: ORTHOPEDIC SURGERY | Facility: CLINIC | Age: 55
End: 2025-03-14
Payer: COMMERCIAL

## 2025-03-14 ENCOUNTER — HOSPITAL ENCOUNTER (OUTPATIENT)
Dept: RADIOLOGY | Facility: CLINIC | Age: 55
Discharge: HOME | End: 2025-03-14
Payer: COMMERCIAL

## 2025-03-14 ENCOUNTER — APPOINTMENT (OUTPATIENT)
Dept: RADIOLOGY | Facility: CLINIC | Age: 55
End: 2025-03-14
Payer: COMMERCIAL

## 2025-03-14 DIAGNOSIS — S92.352A CLOSED DISPLACED FRACTURE OF FIFTH METATARSAL BONE OF LEFT FOOT, INITIAL ENCOUNTER: ICD-10-CM

## 2025-03-14 DIAGNOSIS — S92.352A CLOSED DISPLACED FRACTURE OF FIFTH METATARSAL BONE OF LEFT FOOT, INITIAL ENCOUNTER: Primary | ICD-10-CM

## 2025-03-14 PROCEDURE — 99214 OFFICE O/P EST MOD 30 MIN: CPT | Performed by: ORTHOPAEDIC SURGERY

## 2025-03-14 PROCEDURE — 73630 X-RAY EXAM OF FOOT: CPT | Mod: LT

## 2025-04-04 NOTE — PROGRESS NOTES
Scar Prince    CHIEF COMPLAINT:  Left foot pain     HISTORY OF PRESENT ILLNESS:  This is a 55 y.o. female who returns today for follow up of her L 5th MT base fracture. Has been in the boot since I last saw her. She thinks her pain has improved slightly.   Still smoking, but cutting back.     Assessment and Plan:  1. Closed displaced fracture of fifth metatarsal bone of left foot, initial encounter (Primary)  - L foot XR ordered and reviewed  - XR foot left 3+ views; Future  - Improved pain on palpation - will continue to try to manage non-operatively.     Follow up in 4 weeks, with left foot films upon return.    Thank you for the opportunity to participate in this patient's care.    Physical Exam:  Well appearing female in no acute distress; Alert and oriented.  Left  Leg:  No swelling, erythema, warmth  mild tenderness to palpation of the 5th MT base, improved  Palpable pulses, sensation is intact  Able to flex and extend toes without difficulty      IMAGING:     Imaging was ordered today. Final results and radiologist's interpretation, available in the [facility] health record. Images were reviewed with the patient/family members in the office today. My personal interpretation of the performed imaging is unchanged 5th MT base fracture, some healing noted.    Stacey Pizarro MD

## 2025-04-11 ENCOUNTER — OFFICE VISIT (OUTPATIENT)
Dept: ORTHOPEDIC SURGERY | Facility: CLINIC | Age: 55
End: 2025-04-11
Payer: COMMERCIAL

## 2025-04-11 ENCOUNTER — HOSPITAL ENCOUNTER (OUTPATIENT)
Dept: RADIOLOGY | Facility: CLINIC | Age: 55
Discharge: HOME | End: 2025-04-11
Payer: COMMERCIAL

## 2025-04-11 DIAGNOSIS — S92.352A CLOSED DISPLACED FRACTURE OF FIFTH METATARSAL BONE OF LEFT FOOT, INITIAL ENCOUNTER: ICD-10-CM

## 2025-04-11 DIAGNOSIS — S92.352A CLOSED DISPLACED FRACTURE OF FIFTH METATARSAL BONE OF LEFT FOOT, INITIAL ENCOUNTER: Primary | ICD-10-CM

## 2025-04-11 PROCEDURE — 73630 X-RAY EXAM OF FOOT: CPT | Mod: LT

## 2025-04-11 PROCEDURE — 99213 OFFICE O/P EST LOW 20 MIN: CPT | Performed by: ORTHOPAEDIC SURGERY

## 2025-04-11 ASSESSMENT — PAIN - FUNCTIONAL ASSESSMENT: PAIN_FUNCTIONAL_ASSESSMENT: 0-10

## 2025-04-11 ASSESSMENT — PAIN SCALES - GENERAL: PAINLEVEL_OUTOF10: 5 - MODERATE PAIN

## 2025-05-09 ENCOUNTER — APPOINTMENT (OUTPATIENT)
Dept: ORTHOPEDIC SURGERY | Facility: CLINIC | Age: 55
End: 2025-05-09
Payer: COMMERCIAL

## 2025-05-21 ENCOUNTER — HOSPITAL ENCOUNTER (OUTPATIENT)
Dept: RADIOLOGY | Facility: CLINIC | Age: 55
Discharge: HOME | End: 2025-05-21
Payer: COMMERCIAL

## 2025-05-21 ENCOUNTER — APPOINTMENT (OUTPATIENT)
Dept: ORTHOPEDIC SURGERY | Facility: CLINIC | Age: 55
End: 2025-05-21
Payer: COMMERCIAL

## 2025-05-21 DIAGNOSIS — S92.352D CLOSED DISPLACED FRACTURE OF FIFTH METATARSAL BONE OF LEFT FOOT WITH ROUTINE HEALING, SUBSEQUENT ENCOUNTER: Primary | ICD-10-CM

## 2025-05-21 DIAGNOSIS — S92.352A CLOSED DISPLACED FRACTURE OF FIFTH METATARSAL BONE OF LEFT FOOT, INITIAL ENCOUNTER: ICD-10-CM

## 2025-05-21 PROCEDURE — 99212 OFFICE O/P EST SF 10 MIN: CPT | Performed by: ORTHOPAEDIC SURGERY

## 2025-05-21 PROCEDURE — 73630 X-RAY EXAM OF FOOT: CPT | Mod: LEFT SIDE | Performed by: STUDENT IN AN ORGANIZED HEALTH CARE EDUCATION/TRAINING PROGRAM

## 2025-05-21 PROCEDURE — 73630 X-RAY EXAM OF FOOT: CPT | Mod: LT

## 2025-05-21 ASSESSMENT — PAIN SCALES - GENERAL: PAINLEVEL_OUTOF10: 5 - MODERATE PAIN

## 2025-05-21 ASSESSMENT — PAIN - FUNCTIONAL ASSESSMENT: PAIN_FUNCTIONAL_ASSESSMENT: 0-10

## 2025-05-21 ASSESSMENT — PAIN DESCRIPTION - DESCRIPTORS: DESCRIPTORS: ACHING

## 2025-05-21 NOTE — PROGRESS NOTES
Scar Prince    CHIEF COMPLAINT:  L foot pain    HISTORY OF PRESENT ILLNESS:  This is a 55 y.o. female who returns today for follow up of her L 5th metatarsal fracture. She is now 3-4 months from her injury. Pain is improving from when I last saw her. Wearing crocs today    Location of Pain: Left foot  The pain is constant  The pain is a 5/10 at best  The pain is a 8/10 at worst    Assessment and Plan:  1. Closed displaced fracture of fifth metatarsal bone of left foot with routine healing, subsequent encounter (Primary)  - L foot XR reviewed - healing fracture  - Referral to Physical Therapy; Future  - no restrictions, activity as tolerated.     Follow up as needed    Thank you for the opportunity to participate in this patient's care.    Physical Exam:  Well appearing female in no acute distress; Alert and oriented.  Left  Leg:  No swelling, erythema, warmth  mild tenderness to palpation of the 5th MT base  Palpable pulses, sensation is intact  Able to flex and extend toes without difficulty      IMAGING:     Imaging was ordered today. Final results and radiologist's interpretation, available in the [facility] health record. Images were reviewed with the patient/family members in the office today. My personal interpretation of the performed imaging is healing fracture    Stacey Pizarro MD

## 2025-07-23 ENCOUNTER — OFFICE VISIT (OUTPATIENT)
Dept: ORTHOPEDIC SURGERY | Facility: HOSPITAL | Age: 55
End: 2025-07-23
Payer: COMMERCIAL

## 2025-07-23 DIAGNOSIS — M05.20 RHEUMATOID ARTERITIS (MULTI): Primary | ICD-10-CM

## 2025-07-23 PROCEDURE — 99214 OFFICE O/P EST MOD 30 MIN: CPT | Performed by: FAMILY MEDICINE

## 2025-07-23 PROCEDURE — 99212 OFFICE O/P EST SF 10 MIN: CPT | Performed by: FAMILY MEDICINE

## 2025-07-23 ASSESSMENT — PAIN SCALES - GENERAL: PAINLEVEL_OUTOF10: 8

## 2025-07-23 ASSESSMENT — PAIN - FUNCTIONAL ASSESSMENT: PAIN_FUNCTIONAL_ASSESSMENT: 0-10

## 2025-07-23 NOTE — PROGRESS NOTES
Patient is here for bilateral shoulder pain hand pain can barely lift her arms     Sports Medicine Office Note    Today's Date:  07/23/2025     HPI: Scar Prince is a 55 y.o. RHD female with bipolar 1 with paranoia and recently in recovery house for drug addiction who presents today for follow-up bilateral shoulder, arm and hand pain.    4/30/2024, she presents the complaint of bilateral shoulder pain.  States that this has been intermittent for several years, but is getting progressively worse.  Has severe pain with any overhead activities.  She is currently 5 months sober.  Does not remember any specific or trauma, but states that they are large lapses in her memory due to her drug use.  She has been taking Tylenol and naproxen with minimal improvement of her pain.  Right=left in regards to pain.  Since she has been sober, she has been seen multiple providers for multiple different orthopedic complaints, including visits with orthopedic spine surgery and hand surgery.  She has been complaining of bilateral elbow pain, bilateral carpal tunnel syndrome, and low back pain with radiculopathy.  Also notes that she has some neck pain.  We agreed that she does have bilateral rotator cuff tendinitis.  However, with her complaint of bilateral shoulder, elbow and bilateral wrist pain, highly concerning for cervical radiculopathy or other rheumatologic disease.  Recommend she follow-up with rheumatology to discuss this further.  We agreed to bilateral subacromial corticosteroid injections today.  She tolerated these well.  Activity modifications were reviewed.  If she does not get good relief from bilateral subacromial corticosteroid injections within 4 weeks, recommend following up with Katey Collazo.    5/22/2024, she returns for 3-week follow-up of bilateral arm and shoulder pain.  She states the subacromial cortisone injections helped relieve shoulder, arm, hand and wrist pain and lower extremity pain for 2 to 3  "days after the injection and then wore off completely.  She has been taking Tylenol and ibuprofen 600 mg daily.  She denies interval injury or trauma.  She has trouble lifting her arms due to pain.  Her worst pain today is at her hands.  She has not been able to go to physical therapy.  She denies interval injury or trauma.  We agreed that the subacromial cortisone injection gave her complete pain relief down the entire arm and even into the lower extremities for 2 to 3 days before wear off.  Therefore this is less likely a rotator cuff issue and more likely rheumatological issue.  I recommend that she follow-up with her PCP and pursue rheumatology evaluation.  We agreed to obtain lab work for rheumatological screening test and she will follow-up with her PCP for results.  We agreed to start her on prescription indomethacin.  I do not feel there is anything else I can add to her treatment plan at this time.  She will follow-up if she has an additional complaint.    Today, 07/23/2025, she returns for 14-month follow-up of bilateral arm and shoulder pain.  She was last seen 14 months ago after a trial of subacromial cortisone injection only gave a couple of days of relief.  My concern was a rheumatological issue and ordered screening with her to follow-up with PCP and rheumatology.  Upon reviewing the chart, the labs I ordered were discontinued.  She shares that she eventually was tested through [facility] and her words, \"my rheumatological numbers were off the chart\".  She states that she sees a rheumatologist.  She is back today wanting help with her shoulder pain.  She states that she had an MRI of her shoulders at [facility].  She states she had been in and out of the hospital for her pain since her last visit with me.    She has no other complaints.    Physical Examination:   Unable to be performed before patient left the exam room    Imaging:  === 04/30/24 ===  XR SHOULDER 2+ VIEWS BILATERAL  - Impression -  Mild degenerative " changes of the acromioclavicular joints bilaterally  with small soft tissue calcification on the right near the greater  tuberosity consistent with calcific bursitis/tendinitis.  Signed by: Charlene Xie 5/3/2024 12:59 PM      Visit Diagnoses:  1. Rheumatoid arteritis (Multi)            Assessment and Plan:     We reviewed her previous medical records including her last visit with me on 5/22/2024 where I explained that I could not help her pain situation that this was a rheumatological issue.  She confirmed that she has a rheumatoid diagnosis and sees a rheumatologist.  She does not share any new injury or trauma.  In my efforts to explain how I can and cannot help her and reviewing what is available to me in her medical record, she stood up and stormed out of the room.  She stated she had an MRI at [facility] which I could not find anywhere in the EMR.  I was able to review through her PCP note on 6/26/2025 through [facility] that she has bipolar 1 with paranoia and has a recent substance abuse history with cocaine, marijuana and crack.  I encouraged her to stay in the room and we would discuss more on how I can help her, and tried to explain to her that she should probably see her rheumatologist instead, which per the medical record she has not seen since 3/13/2025.  She did not want to stay in the room and walked down the hallway.  I do not feel there is anything more I can offer this patient in her care and I recommend that she not follow-up with me in the future.    **This note was dictated using Dragon speech recognition software and was not corrected for spelling or grammatical errors**.    Ashok Hernandez MD  Sports Medicine Specialist  [facility] [place]

## 2025-08-07 ENCOUNTER — APPOINTMENT (OUTPATIENT)
Dept: RADIOLOGY | Facility: HOSPITAL | Age: 55
End: 2025-08-07
Payer: COMMERCIAL